# Patient Record
Sex: MALE | Race: WHITE | NOT HISPANIC OR LATINO | Employment: PART TIME | URBAN - METROPOLITAN AREA
[De-identification: names, ages, dates, MRNs, and addresses within clinical notes are randomized per-mention and may not be internally consistent; named-entity substitution may affect disease eponyms.]

---

## 2024-08-01 ENCOUNTER — OFFICE VISIT (OUTPATIENT)
Dept: BEHAVIORAL HEALTH | Facility: CLINIC | Age: 24
End: 2024-08-01
Payer: COMMERCIAL

## 2024-08-01 VITALS — DIASTOLIC BLOOD PRESSURE: 68 MMHG | SYSTOLIC BLOOD PRESSURE: 107 MMHG | HEART RATE: 74 BPM

## 2024-08-01 DIAGNOSIS — F17.200 TOBACCO USE DISORDER: ICD-10-CM

## 2024-08-01 DIAGNOSIS — F41.8 DEPRESSION WITH ANXIETY: ICD-10-CM

## 2024-08-01 DIAGNOSIS — F15.90 STIMULANT USE DISORDER: ICD-10-CM

## 2024-08-01 DIAGNOSIS — F11.20 OPIOID USE DISORDER, SEVERE, DEPENDENCE (H): Primary | ICD-10-CM

## 2024-08-01 PROCEDURE — 99204 OFFICE O/P NEW MOD 45 MIN: CPT | Performed by: NURSE PRACTITIONER

## 2024-08-01 RX ORDER — BUPRENORPHINE AND NALOXONE 4; 1 MG/1; MG/1
FILM, SOLUBLE BUCCAL; SUBLINGUAL
COMMUNITY
Start: 2024-07-24 | End: 2024-08-01 | Stop reason: DRUGHIGH

## 2024-08-01 RX ORDER — HYDROXYZINE HYDROCHLORIDE 25 MG/1
25 TABLET, FILM COATED ORAL 3 TIMES DAILY PRN
Qty: 45 TABLET | Refills: 0 | Status: SHIPPED | OUTPATIENT
Start: 2024-08-01

## 2024-08-01 RX ORDER — CLONIDINE HYDROCHLORIDE 0.1 MG/1
0.1 TABLET ORAL 3 TIMES DAILY PRN
Qty: 45 TABLET | Refills: 0 | Status: SHIPPED | OUTPATIENT
Start: 2024-08-01

## 2024-08-01 RX ORDER — BUPRENORPHINE AND NALOXONE 8; 2 MG/1; MG/1
FILM, SOLUBLE BUCCAL; SUBLINGUAL
Qty: 21 FILM | Refills: 0 | Status: SHIPPED | OUTPATIENT
Start: 2024-08-01 | End: 2024-08-29 | Stop reason: DRUGHIGH

## 2024-08-01 RX ORDER — CLONIDINE HYDROCHLORIDE 0.1 MG/1
TABLET ORAL
COMMUNITY
Start: 2024-07-24 | End: 2024-08-01

## 2024-08-01 RX ORDER — HYDROXYZINE HYDROCHLORIDE 25 MG/1
TABLET, FILM COATED ORAL
COMMUNITY
Start: 2024-07-24 | End: 2024-08-01

## 2024-08-01 ASSESSMENT — PATIENT HEALTH QUESTIONNAIRE - PHQ9: SUM OF ALL RESPONSES TO PHQ QUESTIONS 1-9: 7

## 2024-08-01 ASSESSMENT — COLUMBIA-SUICIDE SEVERITY RATING SCALE - C-SSRS
6. HAVE YOU EVER DONE ANYTHING, STARTED TO DO ANYTHING, OR PREPARED TO DO ANYTHING TO END YOUR LIFE?: NO
1. IN THE PAST MONTH, HAVE YOU WISHED YOU WERE DEAD OR WISHED YOU COULD GO TO SLEEP AND NOT WAKE UP?: NO
2. HAVE YOU ACTUALLY HAD ANY THOUGHTS OF KILLING YOURSELF?: NO

## 2024-08-01 NOTE — PROGRESS NOTES
[facility] Initial Visit    ASSESSMENT/PLAN                                                      1. Opioid use disorder, severe, dependence (H)  23 year ol male with 3 year h/o fentnayl/oxycodone use. Most recently using Oxycodone 200 mg daily, last use was 7/1/24. He was started on suboxone 12 mg/day while at [facility] and is reporting control of symptoms. He has plans to start Nuway tomorrow.   Continue effective dose of suboxone 12 mg/day.  Providing access to narcan  Proceed with plans to begin IOP at [facility] as scheduled.   Encouraged meeting attendance and avoiding people and places that trigger use.   - buprenorphine HCl-naloxone HCl (SUBOXONE) 8-2 MG per film; Place 0.5 Film under the tongue every morning AND 1 Film every evening.  Dispense: 21 Film; Refill: 0  - naloxone (NARCAN) 4 MG/0.1ML nasal spray; Spray 1 spray (4 mg) into one nostril alternating nostrils as needed for opioid reversal every 2-3 minutes until assistance arrives  Dispense: 0.2 mL; Refill: 11    2. Stimulant use disorder   5 month h/o of daily cocaine use last use was 7/1/2024. Denies cravings.   Proceed with plans to begin IOP at [facility].  Meetings encouraged.   Avoid bupropion. Seizure last week after trial of bupropion.     3. Tobacco use disorder  Is smoking and vaping, not interested in quitting.     4. Depression with anxiety  Was started on prozac last week, needs refill. Also taking clonidine and hydroxyzine.   Rx provided and referral placed for psychiatry per his request.   - Adult Mental Health  Referral; Future  - cloNIDine (CATAPRES) 0.1 MG tablet; Take 1 tablet (0.1 mg) by mouth 3 times daily as needed (anxiety)  Dispense: 45 tablet; Refill: 0  - FLUoxetine (PROZAC) 20 MG capsule; Take 1 capsule (20 mg) by mouth daily  Dispense: 30 capsule; Refill: 0  - hydrOXYzine HCl (ATARAX) 25 MG tablet; Take 1 tablet (25 mg) by mouth 3 times daily as needed for anxiety  Dispense: 45 tablet; Refill:  0         Return in about 2 weeks (around 8/15/2024) for Follow up, in person.      Patient counseling completed today:  Discussed mechanism of action, potential risks/benefits/side effects of medications and other recommendations above.    Discussed risk of precipitated withdrawal with initiation of buprenorphine in the presence of full opioid agonists.    Reviewed directions for initiation of buprenorphine to reduce risk of precipitated withdrawal and maximize efficacy.    Harm reduction counseling including never use alone, availability of naloxone, avoiding combination of opioids with benzodiazepines, alcohol, or other sedatives, safer administration.      Discussed importance of avoiding isolation, building a network of supportive relationships, avoiding people/places/things associated with past use to reduce risk of relapse; including motivational interviewing regarding psychosocial treatment for addiction.     SUBJECTIVE                                                    Rooming information:  Preferred name and pronouns: he/him   Reason for visit: to get suboxone, currently on suboxone  Last use of fentanyl or other full opioid agonist: 1month  Last dose of buprenorphine (if applicable:) 8/1/24  Withdrawal symptoms currently: none  Other substances taken in the last 2 weeks: none  LMP (if applicable:) NA  Food/housing/insurance assistance needed: none  3rd party involvement desired: None  Best phone number for patient: [phone]    Depression Response    Patient completed the PHQ-9 assessment for depression and scored >9? No  Question 9 on the PHQ-9 was positive for suicidality? No  Does patient have current mental health provider? No    Is this a virtual visit? No    I personally notified the following: NA        CC/HPI:  Rambo Scott is a 23 year old male with PMH depression  anxiety, seizure (with Bupropion), and opioid use disorder who presents to the Recovery Clinic for initial visit.      Brief  History:  Rambo Scott was first seen in Recovery Clinic on 24. They were referred by [facility].   Patient's reasons for seeking treatment include [facility]  Was started on subxoone while in [facility], is taking 12 mg/day.     Substance Use History :  Opioids:   Age at first use: [age] started using fentanyl. Was tired of being sick so he was began using Oxycodone 200 mg daily.   Current use: substance: Oxycodone; quantity 200 mg route: insufflation ; timing of last use: 2024;      IV drug use: No   History of overdose: No  Previous residential or outpatient treatments for addiction : Yes:  [facility]  Previous medication treatments for addiction: Yes: Suboxone  Longest period of sobriety: 31 days (current)  Medical complications related to substance use: Dennipatricia  Hepatitis C: Negative; Date of most recent testin weeks ago  HIV: Non-reactive; Date of most recent testin weeks ago    DSM-5 OUD criteria met:  Taken in larger amounts/greater time spent in behavior over longer period of time than intended,Yes:    Persistent desire or unsuccessful efforts to cut down or control use/behavior, Yes:    A great deal of time is spent in activities necessary to obtain the substance/participate in the behavior or recover from its effects, Yes:    Cravings, Yes:    Recurrent use/behavior resulting in failure to fulfill major role obligations at work, school, or home, Yes:    Continued use/behavior despite having persistent or recurrent social or interpersonal problems caused or exacerbated by effects of use/behavior, Yes:    Important social, occupational, or recreational activities are given up or reduced because of use/behavior, Yes:    Recurrent use/behavior in situations in which it is physically hazardous, Yes:    Continued use/behavior despite knowledge of having a persistent or recurrent physical or psychological problem that is likely to have been caused or exacerbated by  "use/behavior, Yes:    Tolerance, Yes:     Withdrawal, Yes:      Other Addiction History:  Stimulants (cocaine, methamphetamine, MDMA/ecstasy)   H/o daily cocaine use for 5 motnhs prior to treatment  Sedatives/hypnotics/anxiolytics: (benzodiazepines, GHB, Ambien, phenobarbital)  Occasional use  of Benzos, last use 2 motnhs  Alcohol:   Denies  Nicotine: (cigarettes, vaping, chew/snuff)  Vaping and smoking  Cannabis:   H/o of daily use, \"was a problem until going to treatment.\"  Hallucinogens/Dissociatives: (acid, mushrooms, ketamine)  Has tried a few times  Eating disorder:  Denies  Gambling:   Once        [place] Prescription Drug Monitoring Program Reviewed:  Yes; No prescription refills      PAST PSYCHIATRIC HISTORY:  Diagnoses- Depression and anxiety  Suicide Attempts: No   Hospitalizations: No         8/1/2024     3:00 PM   PHQ   PHQ-9 Total Score 7   Q9: Thoughts of better off dead/self-harm past 2 weeks Not at all         Social History  Housing status: with parents  Education: HS graduate  Employment status: Unemployed, not seeking work  Relationship status: Partnered  Children: no children  Legal: denies        Medications:  Current Outpatient Medications   Medication Sig Dispense Refill    buprenorphine HCl-naloxone HCl (SUBOXONE) 8-2 MG per film Place 0.5 Film under the tongue every morning AND 1 Film every evening. 21 Film 0    cloNIDine (CATAPRES) 0.1 MG tablet Take 1 tablet (0.1 mg) by mouth 3 times daily as needed (anxiety) 45 tablet 0    FLUoxetine (PROZAC) 20 MG capsule Take 1 capsule (20 mg) by mouth daily 30 capsule 0    hydrOXYzine HCl (ATARAX) 25 MG tablet Take 1 tablet (25 mg) by mouth 3 times daily as needed for anxiety 45 tablet 0    naloxone (NARCAN) 4 MG/0.1ML nasal spray Spray 1 spray (4 mg) into one nostril alternating nostrils as needed for opioid reversal every 2-3 minutes until assistance arrives 0.2 mL 11     No current facility-administered medications for this visit.       No Known " "Allergies    No past medical history on file.    No past surgical history on file.    No family history on file.      REVIEW OF SYSTEMS:  General: Withdrawal symptoms as described below.  No recent fevers.   Eyes:  No vision concerns.  No jaundice.    Resp: No coughing, wheezing or shortness of breath  CV: No chest pains or palpitations  GI: No complaints other than as above  : No urinary frequency or dysuria, no discharge  Musculoskeletal: No significant muscle or joint pains other than as above.  No edema  Neurologic: No numbness, tingling, weakness, problems with balance or coordination  Psychiatric: No acute concerns other than as above.   Skin: No rashes or areas of acute infection    OBJECTIVE                                                              /68   Pulse 74     Physical Exam  Cardiovascular:      Rate and Rhythm: Normal rate.   Pulmonary:      Effort: Pulmonary effort is normal. No respiratory distress.   Neurological:      General: No focal deficit present.      Mental Status: He is alert and oriented to person, place, and time.   Psychiatric:         Attention and Perception: Attention normal.         Mood and Affect: Mood normal.         Speech: Speech normal.         Behavior: Behavior is cooperative.         Thought Content: Thought content normal. Thought content does not include suicidal ideation.         Judgment: Judgment normal.         Labs:    UDS:   No results found for: \"BUP\", \"BZO\", \"BAR\", \"DENNIS\", \"MAMP\", \"AMP\", \"MDMA\", \"MTD\", \"LRB220\", \"OXY\", \"PCP\", \"THC\", \"TEMP\", \"SGPOCT\"    *POC urine drug screen does not screen for Fentanyl    No results found for this or any previous visit (from the past 720 hour(s)).                  [facility]  [address], [address]  [place] [address]  [phone]      "

## 2024-08-16 ENCOUNTER — OFFICE VISIT (OUTPATIENT)
Dept: BEHAVIORAL HEALTH | Facility: CLINIC | Age: 24
End: 2024-08-16
Payer: COMMERCIAL

## 2024-08-16 VITALS — SYSTOLIC BLOOD PRESSURE: 119 MMHG | DIASTOLIC BLOOD PRESSURE: 70 MMHG | HEART RATE: 87 BPM

## 2024-08-16 DIAGNOSIS — F11.20 OPIOID USE DISORDER, SEVERE, DEPENDENCE (H): Primary | ICD-10-CM

## 2024-08-16 DIAGNOSIS — F43.21 GRIEF: ICD-10-CM

## 2024-08-16 DIAGNOSIS — F11.20 OPIOID USE DISORDER, SEVERE, DEPENDENCE (H): ICD-10-CM

## 2024-08-16 LAB
AMPHETAMINE QUAL URINE POCT: NEGATIVE
BARBITURATE QUAL URINE POCT: NEGATIVE
BENZODIAZEPINE QUAL URINE POCT: NEGATIVE
BUPRENORPHINE QUAL URINE POCT: ABNORMAL
COCAINE QUAL URINE POCT: NEGATIVE
CREATININE QUAL URINE POCT: ABNORMAL
INTERNAL QC QUAL URINE POCT: ABNORMAL
MDMA QUAL URINE POCT: NEGATIVE
METHADONE QUAL URINE POCT: NEGATIVE
METHAMPHETAMINE QUAL URINE POCT: NEGATIVE
OPIATE QUAL URINE POCT: NEGATIVE
OXYCODONE QUAL URINE POCT: ABNORMAL
PH QUAL URINE POCT: ABNORMAL
PHENCYCLIDINE QUAL URINE POCT: NEGATIVE
POCT KIT EXPIRATION DATE: ABNORMAL
POCT KIT LOT NUMBER: ABNORMAL
SPECIFIC GRAVITY POCT: 1.02
TEMPERATURE URINE POCT: ABNORMAL
THC QUAL URINE POCT: NEGATIVE

## 2024-08-16 PROCEDURE — 99214 OFFICE O/P EST MOD 30 MIN: CPT | Performed by: FAMILY MEDICINE

## 2024-08-16 PROCEDURE — G0480 DRUG TEST DEF 1-7 CLASSES: HCPCS | Mod: 90

## 2024-08-16 PROCEDURE — G2211 COMPLEX E/M VISIT ADD ON: HCPCS | Performed by: FAMILY MEDICINE

## 2024-08-16 PROCEDURE — 80305 DRUG TEST PRSMV DIR OPT OBS: CPT | Performed by: FAMILY MEDICINE

## 2024-08-16 RX ORDER — BUPRENORPHINE AND NALOXONE 4; 1 MG/1; MG/1
1 FILM, SOLUBLE BUCCAL; SUBLINGUAL 3 TIMES DAILY
Qty: 42 FILM | Refills: 0 | Status: SHIPPED | OUTPATIENT
Start: 2024-08-16 | End: 2024-08-29 | Stop reason: DRUGHIGH

## 2024-08-16 RX ORDER — BUPRENORPHINE AND NALOXONE 4; 1 MG/1; MG/1
1 FILM, SOLUBLE BUCCAL; SUBLINGUAL 3 TIMES DAILY
Qty: 42 FILM | Refills: 0 | Status: SHIPPED | OUTPATIENT
Start: 2024-08-16 | End: 2024-08-16

## 2024-08-16 ASSESSMENT — PATIENT HEALTH QUESTIONNAIRE - PHQ9: SUM OF ALL RESPONSES TO PHQ QUESTIONS 1-9: 9

## 2024-08-16 NOTE — PROGRESS NOTES
[facility] Follow Up    ASSESSMENT/PLAN                                                      1. Opioid use disorder, severe, dependence (H)  Brief return to use.  He reflects on this and has discussed with his counselor at [facility].  Continue Suboxone, would like to change dosing to 4-1mg TID instead of using 8-2mg films - confirmed with pharmacy that 4-1mg films in stock and script will be covered by insurance.  Encouraged continued programming at [facility].    - Drugs of Abuse Screen Urine (POC CUPS) POCT; Standing  - Buprenorphine & Metabolite Screen; Future  - Drugs of Abuse Screen Urine (POC CUPS) POCT  - Comprehensive metabolic panel (BMP + Alb, Alk Phos, ALT, AST, Total. Bili, TP); Future  - buprenorphine HCl-naloxone HCl (SUBOXONE) 4-1 MG per film; Place 1 Film under the tongue 3 times daily  Dispense: 42 Film; Refill: 0    2. Grief  Encouraged him to reach out to clinic to schedule with Don if needed.           Return in about 2 weeks (around 8/30/2024) for Follow up, in person.      Patient counseling completed today:  Discussed mechanism of action, potential risks/benefits/side effects of medications and other recommendations above.     Discussed risk of precipitated withdrawal with initiation of buprenorphine in the presence of full opioid agonists.    Reviewed directions for initiation of buprenorphine to reduce risk of precipitated withdrawal and maximize efficacy.    Harm reduction counseling including never use alone, availability of naloxone, risks associated with concurrent use of opioids and benzodiazepines, alcohol, or other sedatives, safer administration as applicable.  Discussed importance of avoiding isolation, building a network of supportive relationships, avoiding people/places/things associated with past use to reduce risk of relapse; including motivational interviewing regarding psychosocial interventions.   SUBJECTIVE                                                           CC/HPI:  Rambo Scott is a 23 year old male with PMH depression  anxiety, seizure (with Bupropion), and opioid use disorder who presents to the Recovery Clinic for return visit.       Brief History:  Rambo Scott was first seen in Recovery Clinic on 08/01/24. They were referred by [facility].   Patient's reasons for seeking treatment include to establish with Suboxone prescriber.  Was started on Subxone 12-3mg while in [facility].    Recommendations last visit: 8/1/24  1. Opioid use disorder, severe, dependence (H)  23 year ol male with 3 year h/o fentnayl/oxycodone use. Most recently using Oxycodone 200 mg daily, last use was 7/1/24. He was started on suboxone 12 mg/day while at [facility] and is reporting control of symptoms. He has plans to start Nuway tomorrow.   Continue effective dose of suboxone 12 mg/day.  Providing access to narcan  Proceed with plans to begin IOP at [facility] as scheduled.   Encouraged meeting attendance and avoiding people and places that trigger use.   - buprenorphine HCl-naloxone HCl (SUBOXONE) 8-2 MG per film; Place 0.5 Film under the tongue every morning AND 1 Film every evening.  Dispense: 21 Film; Refill: 0  - naloxone (NARCAN) 4 MG/0.1ML nasal spray; Spray 1 spray (4 mg) into one nostril alternating nostrils as needed for opioid reversal every 2-3 minutes until assistance arrives  Dispense: 0.2 mL; Refill: 11     2. Stimulant use disorder   5 month h/o of daily cocaine use last use was 7/1/2024. Denies cravings.   Proceed with plans to begin IOP at [facility].  Meetings encouraged.   Avoid bupropion. Seizure last week after trial of bupropion.      3. Tobacco use disorder  Is smoking and vaping, not interested in quitting.      4. Depression with anxiety  Was started on prozac last week, needs refill. Also taking clonidine and hydroxyzine.   Rx provided and referral placed for psychiatry per his request.   - Adult Mental Health  Referral; Future  - cloNIDine  "(CATAPRES) 0.1 MG tablet; Take 1 tablet (0.1 mg) by mouth 3 times daily as needed (anxiety)  Dispense: 45 tablet; Refill: 0  - FLUoxetine (PROZAC) 20 MG capsule; Take 1 capsule (20 mg) by mouth daily  Dispense: 30 capsule; Refill: 0  - hydrOXYzine HCl (ATARAX) 25 MG tablet; Take 1 tablet (25 mg) by mouth 3 times daily as needed for anxiety  Dispense: 45 tablet; Refill: 0    24 HPI:  Shared recent return to use - left outpatient at noon and a friend called him crying and told him that his 18 year old sister  (overdose), they are all close friends, Thor saw her 3 days ago  Friend picked him up and they spent day with family  Took a shot of alcohol with her mom, smoked weed with cannabis and felt really awful being high, took a Perc 30 - was planning to use more but he stopped - \"it wasn't giving him what he needed anymore\"  Left and went to a meeting  Talked to his counselor today, felt supported but also increased his shame/guilt/self-disgust  Took Suboxone today, feels current dose is effective    Substance Use History :  Opioids:   Age at first use: [age] started using fentanyl. Was tired of being sick so he was began using Oxycodone 200 mg daily.   Current use: substance: Oxycodone; quantity 200 mg route: insufflation ; timing of last use: 2024;                 IV drug use: No   History of overdose: No  Previous residential or outpatient treatments for addiction : Yes:  [facility]  Previous medication treatments for addiction: Yes: Suboxone  Longest period of sobriety: 31 days (current)  Medical complications related to substance use: Dennipatricia  Hepatitis C: Negative; Date of most recent testin weeks ago  HIV: Non-reactive; Date of most recent testin weeks ago     Other Addiction History:  Stimulants (cocaine, methamphetamine, MDMA/ecstasy)   H/o daily cocaine use for 5 motnhs prior to treatment  Sedatives/hypnotics/anxiolytics: (benzodiazepines, GHB, Ambien, phenobarbital)  Occasional use  of " "Benzos, last use 2 motnhs  Alcohol:   Denies  Nicotine: (cigarettes, vaping, chew/snuff)  Vaping and smoking  Cannabis:   H/o of daily use, \"was a problem until going to treatment.\"  Hallucinogens/Dissociatives: (acid, mushrooms, ketamine)  Has tried a few times  Eating disorder:  Denies  Gambling:              Once     PAST PSYCHIATRIC HISTORY:  Diagnoses- Depression and anxiety  Suicide Attempts: No   Hospitalizations: No          8/1/2024     3:00 PM 8/16/2024     3:00 PM   PHQ   PHQ-9 Total Score 7 9   Q9: Thoughts of better off dead/self-harm past 2 weeks Not at all Not at all       Social History  Housing status: with parents  Education: HS graduate  Employment status: Unemployed, not seeking work  Relationship status: Partnered  Children: no children  Legal: denies      Labs discussed with patient?  Yes      [place] Prescription Drug Monitoring Program Reviewed:  Yes        Medications:  Current Outpatient Medications   Medication Sig Dispense Refill    buprenorphine HCl-naloxone HCl (SUBOXONE) 4-1 MG per film Place 1 Film under the tongue 3 times daily 42 Film 0    buprenorphine HCl-naloxone HCl (SUBOXONE) 8-2 MG per film Place 0.5 Film under the tongue every morning AND 1 Film every evening. 21 Film 0    cloNIDine (CATAPRES) 0.1 MG tablet Take 1 tablet (0.1 mg) by mouth 3 times daily as needed (anxiety) 45 tablet 0    FLUoxetine (PROZAC) 20 MG capsule Take 1 capsule (20 mg) by mouth daily 30 capsule 0    hydrOXYzine HCl (ATARAX) 25 MG tablet Take 1 tablet (25 mg) by mouth 3 times daily as needed for anxiety 45 tablet 0    naloxone (NARCAN) 4 MG/0.1ML nasal spray Spray 1 spray (4 mg) into one nostril alternating nostrils as needed for opioid reversal every 2-3 minutes until assistance arrives 0.2 mL 11     No current facility-administered medications for this visit.       No Known Allergies    PMH, PSH, FamHx reviewed      OBJECTIVE                                                      /70   Pulse 87 "     Physical Exam  Vitals and nursing note reviewed.   Constitutional:       General: He is not in acute distress.     Appearance: Normal appearance. He is not ill-appearing or diaphoretic.   Cardiovascular:      Rate and Rhythm: Normal rate.   Pulmonary:      Effort: Pulmonary effort is normal. No respiratory distress.   Neurological:      General: No focal deficit present.      Mental Status: He is alert and oriented to person, place, and time.   Psychiatric:         Mood and Affect: Mood normal. Affect is tearful.         Behavior: Behavior normal.         Thought Content: Thought content normal.         Judgment: Judgment normal.         Labs:    UDS:    Lab Results   Component Value Date    BUP Screen Positive (A) 08/16/2024    BZO Negative 08/16/2024    BAR Negative 08/16/2024    DENNIS Negative 08/16/2024    MAMP Negative 08/16/2024    AMP Negative 08/16/2024    MDMA Negative 08/16/2024    MTD Negative 08/16/2024    [id number] Negative 08/16/2024    OXY Screen Positive (A) 08/16/2024    PCP Negative 08/16/2024    THC Negative 08/16/2024    TEMP 90 F 08/16/2024    SGPOCT 1.025 08/16/2024     *POC urine drug screen does not screen for Fentanyl      No results found for this or any previous visit (from the past 240 hour(s)).      Simin Anaya, [place] [id number] [place] [id number]  [phone]

## 2024-08-16 NOTE — NURSING NOTE
[facility]      Rooming information:  RELAPSED YESTERDAY DUE TO FRIEND PASSING  Approximate last use of full opioid agonist: 8/15/24 oxy 30  Taking buprenorphine? Yes:   How much per day? 12mg  Number of buprenorphine films/tablets remaining currently: 0  Side effects related to buprenorphine (constipation, dry mouth, sedation?) No   Narcan currently available: Yes  Other recent substance use:    Alcohol  and Cannabis   NICOTINE-Yes:   If using nicotine, ready to quit? No    Point of care urine drug screen positive for:  Lab Results   Component Value Date    BUP Screen Positive (A) 08/16/2024    BZO Negative 08/16/2024    BAR Negative 08/16/2024    DENNIS Negative 08/16/2024    MAMP Negative 08/16/2024    AMP Negative 08/16/2024    MDMA Negative 08/16/2024    MTD Negative 08/16/2024    [id number] Negative 08/16/2024    OXY Screen Positive (A) 08/16/2024    PCP Negative 08/16/2024    THC Negative 08/16/2024    TEMP 90 F 08/16/2024    SGPOCT 1.025 08/16/2024       *POC urine drug screen does not screen for Fentanyl        Depression Response    Patient completed the PHQ-9 assessment for depression and scored >9? Yes  Question 9 on the PHQ-9 was positive for suicidality? No  Does patient have current mental health provider? No  C-SSRS screener risk level.       Is this a virtual visit? No    I personally notified the following: [place]          8/16/2024     3:00 PM   PHQ Assesment Total Score(s)   PHQ-9 Score 9         Housing needs: stable    Insurance: active    Current legal issues: none    Contact information up to date? yes    3rd Party Involvement not today (please obtain ELI if pt would like to include)    Brianna Helm MA  August 16, 2024  3:25 PM

## 2024-08-16 NOTE — PATIENT INSTRUCTIONS
[place] [id number], [address]   [place], [address]  Phone: [phone]  Fax: [phone]    Open Monday-Friday  Closed over lunch hour  Walk in hours: 9am-11:30am and 12:30-3pm

## 2024-08-19 LAB
BUPRENORPHINE UR-MCNC: 80 NG/ML
BUPRENORPHINE UR-MCNC: <2 NG/ML
NALOXONE UR CFM-MCNC: <100 NG/ML
NORBUPRENORPHINE UR CFM-MCNC: 153 NG/ML
NORBUPRENORPHINE UR-MCNC: 34 NG/ML

## 2024-08-29 ENCOUNTER — OFFICE VISIT (OUTPATIENT)
Dept: BEHAVIORAL HEALTH | Facility: CLINIC | Age: 24
End: 2024-08-29
Payer: COMMERCIAL

## 2024-08-29 VITALS — DIASTOLIC BLOOD PRESSURE: 61 MMHG | HEART RATE: 77 BPM | SYSTOLIC BLOOD PRESSURE: 105 MMHG

## 2024-08-29 DIAGNOSIS — T40.2X5A CONSTIPATION DUE TO OPIOID THERAPY: ICD-10-CM

## 2024-08-29 DIAGNOSIS — F15.90 STIMULANT USE DISORDER: ICD-10-CM

## 2024-08-29 DIAGNOSIS — K59.03 CONSTIPATION DUE TO OPIOID THERAPY: ICD-10-CM

## 2024-08-29 DIAGNOSIS — F11.20 OPIOID USE DISORDER, SEVERE, DEPENDENCE (H): Primary | ICD-10-CM

## 2024-08-29 LAB
AMPHETAMINE QUAL URINE POCT: NEGATIVE
BARBITURATE QUAL URINE POCT: NEGATIVE
BENZODIAZEPINE QUAL URINE POCT: NEGATIVE
BUPRENORPHINE QUAL URINE POCT: ABNORMAL
COCAINE QUAL URINE POCT: NEGATIVE
CREATININE QUAL URINE POCT: ABNORMAL
INTERNAL QC QUAL URINE POCT: ABNORMAL
MDMA QUAL URINE POCT: NEGATIVE
METHADONE QUAL URINE POCT: NEGATIVE
METHAMPHETAMINE QUAL URINE POCT: NEGATIVE
OPIATE QUAL URINE POCT: NEGATIVE
OXYCODONE QUAL URINE POCT: NEGATIVE
PH QUAL URINE POCT: ABNORMAL
PHENCYCLIDINE QUAL URINE POCT: NEGATIVE
POCT KIT EXPIRATION DATE: ABNORMAL
POCT KIT LOT NUMBER: ABNORMAL
SPECIFIC GRAVITY POCT: 1
TEMPERATURE URINE POCT: ABNORMAL
THC QUAL URINE POCT: NEGATIVE

## 2024-08-29 PROCEDURE — 80305 DRUG TEST PRSMV DIR OPT OBS: CPT | Performed by: NURSE PRACTITIONER

## 2024-08-29 PROCEDURE — 99214 OFFICE O/P EST MOD 30 MIN: CPT | Performed by: NURSE PRACTITIONER

## 2024-08-29 RX ORDER — DOCUSATE SODIUM 100 MG/1
100 CAPSULE, LIQUID FILLED ORAL 2 TIMES DAILY
Qty: 60 CAPSULE | Refills: 1 | Status: SHIPPED | OUTPATIENT
Start: 2024-08-29

## 2024-08-29 RX ORDER — FLUOXETINE 10 MG/1
30 TABLET, FILM COATED ORAL DAILY
COMMUNITY
End: 2024-09-26

## 2024-08-29 RX ORDER — BUPRENORPHINE AND NALOXONE 4; 1 MG/1; MG/1
2 FILM, SOLUBLE BUCCAL; SUBLINGUAL 2 TIMES DAILY
Qty: 56 FILM | Refills: 0 | Status: SHIPPED | OUTPATIENT
Start: 2024-08-29 | End: 2024-09-13

## 2024-08-29 RX ORDER — POLYETHYLENE GLYCOL 3350 17 G/17G
1 POWDER, FOR SOLUTION ORAL DAILY
Qty: 578 G | Refills: 0 | Status: SHIPPED | OUTPATIENT
Start: 2024-08-29

## 2024-08-29 ASSESSMENT — PATIENT HEALTH QUESTIONNAIRE - PHQ9: SUM OF ALL RESPONSES TO PHQ QUESTIONS 1-9: 6

## 2024-08-29 NOTE — NURSING NOTE
[facility]      Rooming information:    Discuss increasing suboxone dose; having some cravings     Approximate last use of full opioid agonist: 8/15/24  Taking buprenorphine? Yes:   How much per day? 12mg   Number of buprenorphine films/tablets remaining currently: 0  Side effects related to buprenorphine (constipation, dry mouth, sedation?) No   Narcan currently available: Yes  Other recent substance use:    Denies  NICOTINE-Yes:   If using nicotine, ready to quit? No    Point of care urine drug screen positive for:  Lab Results   Component Value Date    BUP Screen Positive (A) 08/29/2024    BZO Negative 08/29/2024    BAR Negative 08/29/2024    DENNIS Negative 08/29/2024    MAMP Negative 08/29/2024    AMP Negative 08/29/2024    MDMA Negative 08/29/2024    MTD Negative 08/29/2024    [id number] Negative 08/29/2024    OXY Negative 08/29/2024    PCP Negative 08/29/2024    THC Negative 08/29/2024    TEMP 92 F 08/29/2024    SGPOCT 1.005 08/29/2024       *POC urine drug screen does not screen for Fentanyl      Depression Response    Patient completed the PHQ-9 assessment for depression and scored >9? No  Question 9 on the PHQ-9 was positive for suicidality? No  Does patient have current mental health provider? No  C-SSRS screener risk level.       Is this a virtual visit? No    I personally notified the following: [place]           8/29/2024     3:00 PM   PHQ Assesment Total Score(s)   PHQ-9 Score 6         Housing needs: stable     Insurance: active     Current legal issues: none     Contact information up to date? Yes     3rd Party Involvement  (please obtain ELI if pt would like to include)    Glenn River MA  August 29, 2024  3:33 PM

## 2024-08-29 NOTE — PROGRESS NOTES
[facility] Follow Up    ASSESSMENT/PLAN                                                      1. Opioid use disorder, severe, dependence (H)  - No opioid use since last visit, persistent cravings, increased dose to 16 mg buprenorphine/day. Pt prefers 4 mg films due to taste.   - POC UDS completed today as expected   - continue programming at [facility] beds at the [facility]   - confirms access to narcan   - Drugs of Abuse Screen Urine (POC CUPS) POCT  - buprenorphine HCl-naloxone HCl (SUBOXONE) 4-1 MG per film; Place 2 Film under the tongue 2 times daily for 14 days.  Dispense: 56 Film; Refill: 0    2. Stimulant use disorder  - no recnet use or cravings.   - monitor   - continue programming at [facility] beds at the [facility]     3. Constipation due to opioid therapy  - Start miralax and colace. After BM become more regular and soft change miralax to prn use and continue colace.   - increase dietary fiber and daily water intake   - OTC hemorrhoid  preparations if needed   - if no improvement will start Movantik or Amitiza   - docusate sodium (COLACE) 100 MG capsule; Take 1 capsule (100 mg) by mouth 2 times daily.  Dispense: 60 capsule; Refill: 1  - polyethylene glycol (MIRALAX) 17 GM/Dose powder; Take 17 g (1 Capful) by mouth daily.  Dispense: 578 g; Refill: 0         Return in about 2 weeks (around 9/12/2024) for Follow up, with any available provider, in person.      Patient counseling completed today:  Discussed mechanism of action, potential risks/benefits/side effects of medications and other recommendations above.    Harm reduction counseling including never use alone, availability of naloxone, risks associated with concurrent use of opioids and benzodiazepines, alcohol, or other sedatives, safer administration as applicable.  Discussed importance of avoiding isolation, building a network of supportive relationships, avoiding people/places/things associated with past use to reduce risk of relapse; including motivational interviewing  regarding psychosocial interventions.     A/P from most recent visit:   1. Opioid use disorder, severe, dependence (H)  Brief return to use.  He reflects on this and has discussed with his counselor at [facility].  Continue Suboxone, would like to change dosing to 4-1mg TID instead of using 8-2mg films - confirmed with pharmacy that 4-1mg films in stock and script will be covered by insurance.  Encouraged continued programming at [facility].    - Drugs of Abuse Screen Urine (POC CUPS) POCT; Standing  - Buprenorphine & Metabolite Screen; Future  - Drugs of Abuse Screen Urine (POC CUPS) POCT  - Comprehensive metabolic panel (BMP + Alb, Alk Phos, ALT, AST, Total. Bili, TP); Future  - buprenorphine HCl-naloxone HCl (SUBOXONE) 4-1 MG per film; Place 1 Film under the tongue 3 times daily  Dispense: 42 Film; Refill: 0     2. Grief  Encouraged him to reach out to clinic to schedule with Don if needed.        SUBJECTIVE                                                        CC/HPI:  Rambo Scott is a 23 year old male with PMH depression  anxiety, seizure (with Bupropion), and opioid use disorder who presents to the [facility] Clinic for return visit.        08/29/24 HPI:  - has been taking 4 mg in AM and 8 mg in PM, wondering if does can be increased for cravings. Cravings have been daily.   - taking Miralax prn for constipation  - last opioid use was 8/15/24   - did schedule PCP appointment  - has had elevated LFT's in the past,  and AST was 102. Previously 274 and 105   - grew up in [place], he lives with his mother. His mother is a nursing professor   - he would like to move back to [place] one day      Brief History:  Rambo Scott was first seen in [facility] on 08/01/24. They were referred by [facility].   Patient's reasons for seeking treatment include to establish with Suboxone prescriber.  Was started on Subxone 12-3mg while in [facility] [facility].     Substance Use History :  Opioids:   Age at first use: [age]  "started using fentanyl. Was tired of being sick so he was began using Oxycodone 200 mg daily.   Current use: substance: Oxycodone; quantity 200 mg route: insufflation ; timing of last use: 8/216/24                IV drug use: No   History of overdose: No  Previous residential or outpatient treatments for addiction : Yes:  [facility]  Previous medication treatments for addiction: Yes: Suboxone  Longest period of sobriety: 31 days (current)  Medical complications related to substance use: Dennies  Hepatitis C: Negative on 7/17/23, reports more recent screening in July 2024   HIV: Non-reactive on 7/17/23, reports more recent screening in July 2024      Other Addiction History:  Stimulants (cocaine, methamphetamine, MDMA/ecstasy)   H/o daily cocaine use for 5 motnhs prior to treatment  Sedatives/hypnotics/anxiolytics: (benzodiazepines, GHB, Ambien, phenobarbital)  Occasional use  of Benzos, last use 2 motnhs  Alcohol:   Denies  Nicotine: (cigarettes, vaping, chew/snuff)  Vaping and smoking  Cannabis:   H/o of daily use, \"was a problem until going to treatment.\"  Hallucinogens/Dissociatives: (acid, mushrooms, ketamine)  Has tried a few times  Eating disorder:  Denies  Gambling:              Once     PAST PSYCHIATRIC HISTORY:  Diagnoses- Depression and anxiety  Suicide Attempts: No   Hospitalizations: No           8/1/2024     3:00 PM 8/16/2024     3:00 PM 8/29/2024     3:00 PM   PHQ   PHQ-9 Total Score 7 9 6   Q9: Thoughts of better off dead/self-harm past 2 weeks Not at all Not at all Not at all       Social History  Housing status: with parents  Education: HS graduate  Employment status: Unemployed, not seeking work  Relationship status: Partnered  Children: no children  Legal: denies         Labs discussed with patient?  Yes      [place] Prescription Drug Monitoring Program Reviewed:  Yes- consistent with patient reports     Medications:  Current Outpatient Medications   Medication Sig Dispense Refill    " buprenorphine HCl-naloxone HCl (SUBOXONE) 4-1 MG per film Place 2 Film under the tongue 2 times daily for 14 days. 56 Film 0    docusate sodium (COLACE) 100 MG capsule Take 1 capsule (100 mg) by mouth 2 times daily. 60 capsule 1    FLUoxetine (PROZAC) 10 MG tablet Take 30 mg by mouth daily.      polyethylene glycol (MIRALAX) 17 GM/Dose powder Take 17 g (1 Capful) by mouth daily. 578 g 0    cloNIDine (CATAPRES) 0.1 MG tablet Take 1 tablet (0.1 mg) by mouth 3 times daily as needed (anxiety) 45 tablet 0    hydrOXYzine HCl (ATARAX) 25 MG tablet Take 1 tablet (25 mg) by mouth 3 times daily as needed for anxiety 45 tablet 0    naloxone (NARCAN) 4 MG/0.1ML nasal spray Spray 1 spray (4 mg) into one nostril alternating nostrils as needed for opioid reversal every 2-3 minutes until assistance arrives 0.2 mL 11     No current facility-administered medications for this visit.       No Known Allergies    PMH, PSH, FamHx reviewed      OBJECTIVE                                                      /61   Pulse 77     Physical Exam  Constitutional:       General: He is not in acute distress.     Appearance: Normal appearance.   Eyes:      Extraocular Movements: Extraocular movements intact.   Cardiovascular:      Rate and Rhythm: Normal rate.   Pulmonary:      Effort: Pulmonary effort is normal.   Neurological:      Mental Status: He is alert and oriented to person, place, and time.   Psychiatric:         Attention and Perception: Attention and perception normal.         Mood and Affect: Mood and affect normal.         Speech: Speech normal.         Behavior: Behavior is cooperative.         Thought Content: Thought content normal.         Cognition and Memory: Memory normal.         Judgment: Judgment normal.         Labs:    UDS:    Lab Results   Component Value Date    BUP Screen Positive (A) 08/29/2024    BZO Negative 08/29/2024    BAR Negative 08/29/2024    DENNIS Negative 08/29/2024    MAMP Negative 08/29/2024    AMP  Negative 08/29/2024    MDMA Negative 08/29/2024    MTD Negative 08/29/2024    [id number] Negative 08/29/2024    OXY Negative 08/29/2024    PCP Negative 08/29/2024    THC Negative 08/29/2024    TEMP 92 F 08/29/2024    SGPOCT 1.005 08/29/2024     *POC urine drug screen does not screen for Fentanyl      Recent Results (from the past 240 hour(s))   Drugs of Abuse Screen Urine (POC CUPS) POCT    Collection Time: 08/29/24  3:37 PM   Result Value Ref Range    POCT Kit Lot Number [id number]     POCT Kit Expiration Date 4/8/26     Temperature Urine POCT 92 F 90 F, 92 F, 94 F, 96 F, 98 F, 100 F    Specific [place] POCT 1.005 1.005, 1.015, 1.025    pH Qual Urine POCT 9 pH 4 pH, 5 pH, 7 pH, 9 pH    Creatinine Qual Urine POCT 100 mg/dL 20 mg/dL, 50 mg/dL, 100 mg/dL, 200 mg/dL    Internal QC Qual Urine POCT Valid Valid    Amphetamine Qual Urine POCT Negative Negative    Barbiturate Qual Urine POCT Negative Negative    Buprenorphine Qual Urine POCT Screen Positive (A) Negative    Benzodiazepine Qual Urine POCT Negative Negative    Cocaine Qual Urine POCT Negative Negative    Methamphetamine Qual Urine POCT Negative Negative    MDMA Qual Urine POCT Negative Negative    Methadone Qual Urine POCT Negative Negative    Opiate Qual Urine POCT Negative Negative    Oxycodone Qual Urine POCT Negative Negative    Phencyclidine Qual Urine POCT Negative Negative    THC Qual Urine POCT Negative Negative         OBINNA Browne [place] [id number] [address] [address] [phone]

## 2024-09-13 ENCOUNTER — OFFICE VISIT (OUTPATIENT)
Dept: BEHAVIORAL HEALTH | Facility: CLINIC | Age: 24
End: 2024-09-13
Payer: COMMERCIAL

## 2024-09-13 VITALS — DIASTOLIC BLOOD PRESSURE: 62 MMHG | SYSTOLIC BLOOD PRESSURE: 99 MMHG | OXYGEN SATURATION: 97 % | HEART RATE: 109 BPM

## 2024-09-13 DIAGNOSIS — Z79.891 ENCOUNTER FOR MONITORING OPIOID MAINTENANCE THERAPY: ICD-10-CM

## 2024-09-13 DIAGNOSIS — F17.200 TOBACCO USE DISORDER: ICD-10-CM

## 2024-09-13 DIAGNOSIS — F15.90 STIMULANT USE DISORDER: ICD-10-CM

## 2024-09-13 DIAGNOSIS — Z51.81 ENCOUNTER FOR MONITORING OPIOID MAINTENANCE THERAPY: ICD-10-CM

## 2024-09-13 DIAGNOSIS — F11.20 OPIOID USE DISORDER, SEVERE, DEPENDENCE (H): Primary | ICD-10-CM

## 2024-09-13 DIAGNOSIS — F41.8 DEPRESSION WITH ANXIETY: ICD-10-CM

## 2024-09-13 LAB
AMPHETAMINE QUAL URINE POCT: NEGATIVE
BARBITURATE QUAL URINE POCT: NEGATIVE
BENZODIAZEPINE QUAL URINE POCT: NEGATIVE
BUPRENORPHINE QUAL URINE POCT: ABNORMAL
COCAINE QUAL URINE POCT: NEGATIVE
CREATININE QUAL URINE POCT: ABNORMAL
INTERNAL QC QUAL URINE POCT: ABNORMAL
MDMA QUAL URINE POCT: NEGATIVE
METHADONE QUAL URINE POCT: NEGATIVE
METHAMPHETAMINE QUAL URINE POCT: NEGATIVE
OPIATE QUAL URINE POCT: NEGATIVE
OXYCODONE QUAL URINE POCT: NEGATIVE
PH QUAL URINE POCT: ABNORMAL
PHENCYCLIDINE QUAL URINE POCT: NEGATIVE
POCT KIT EXPIRATION DATE: ABNORMAL
POCT KIT LOT NUMBER: ABNORMAL
SPECIFIC GRAVITY POCT: 1.01
TEMPERATURE URINE POCT: ABNORMAL
THC QUAL URINE POCT: NEGATIVE

## 2024-09-13 PROCEDURE — 80305 DRUG TEST PRSMV DIR OPT OBS: CPT | Performed by: FAMILY MEDICINE

## 2024-09-13 PROCEDURE — 99215 OFFICE O/P EST HI 40 MIN: CPT | Performed by: FAMILY MEDICINE

## 2024-09-13 RX ORDER — BUPRENORPHINE AND NALOXONE 4; 1 MG/1; MG/1
2 FILM, SOLUBLE BUCCAL; SUBLINGUAL 2 TIMES DAILY
Qty: 60 FILM | Refills: 0 | Status: SHIPPED | OUTPATIENT
Start: 2024-09-13 | End: 2024-09-25

## 2024-09-13 ASSESSMENT — PATIENT HEALTH QUESTIONNAIRE - PHQ9: SUM OF ALL RESPONSES TO PHQ QUESTIONS 1-9: 10

## 2024-09-13 NOTE — PROGRESS NOTES
[facility] Follow Up    ASSESSMENT/PLAN                                                    1. Opioid use disorder, severe, dependence (H)  Pt reports last use of full opioid agonist remains 24.  Endorses cravings in setting of death of friend and her recent .  Is not interested in increasing TDD of buprenorphine.    Reports occasionally swallowing Suboxone film.    Recommended consolidating doses to help reduce risk of taking subtherapeutic dose.    Pt firmly wants to continue 4mg films due to intolerance of taste of higher strength films.   Reviewed importance of sublingual administration.   Pt would benefit from XR buprenorphine.  He is interested in transfer to [facility] in the future, not now.    Continue programming at [facility]  Naloxone prescribed  Continue colace, Miralax prn OIC  - Drugs of Abuse Screen Urine (POC CUPS) POCT  - Drug Confirmation Panel Urine with Creat - lab collect; Future  - naloxone (NARCAN) 4 MG/0.1ML nasal spray; Spray 1 spray (4 mg) into one nostril alternating nostrils as needed for opioid reversal. every 2-3 minutes until assistance arrives  Dispense: 0.2 mL; Refill: 11  - buprenorphine HCl-naloxone HCl (SUBOXONE) 4-1 MG per film; Place 2 Film under the tongue 2 times daily.  Dispense: 60 Film; Refill: 0    2. Encounter for monitoring opioid maintenance therapy  - Drug Confirmation Panel Urine with Creat - lab collect; Future    3. Stimulant use disorder  Denies cravings or return to use.   Continue programming at [facility]  Avoid bupropion due to h/o seizure with previous trial    4. Tobacco use disorder  Not interested in quitting at this time    5. Depression with anxiety  Continues on fluoxetine 20mg/day.  Plans to start increased dose of 30mg/day prescribed by PCP in near future.   Taking clonidine and hydroxyzine daily prn.    Pt would benefit from individual therapy after treatment.      Return in 12 days (on 2024) for Follow up, in  "person.      Patient counseling completed today:  Discussed mechanism of action, potential risks/benefits/side effects of medications and other recommendations above.    Harm reduction counseling including never use alone, availability of naloxone, risks associated with concurrent use of opioids and benzodiazepines, alcohol, or other sedatives, safer administration as applicable.  Discussed importance of avoiding isolation, building a network of supportive relationships, avoiding people/places/things associated with past use to reduce risk of relapse; including motivational interviewing regarding psychosocial interventions.       SUBJECTIVE                                                        CC/HPI:  Rambo Scott is a 23 year old male with PMH seizure 24 after starting bupropion, depression  anxiety, tobacco use disorder, stimulant use disorder,  and opioid use disorder on buprenorphine who presents to the [facility] for return visit.      Brief History:  Rambo Scott was first seen in Recovery Clinic on 24. They were referred by [facility] to continue buprenorphine as he began IOP with [facility].  Continued buprenorphine 12mg/day through  at initial visit.   - Brief return to use after initial visit in setting of death of friend's sister  - 24 buprenorphine increased to 16mg/day      24 HPI:  Pt states he has been taking buprenorphine every day, taking 4mg QID most days, occasionally only TID.  He reports he occasionally swallows the dose vs letting it dissolve entirely.    He does endorse cravings recently, occurred after the recent  for his friend who  last month.  He states he has not been experiencing cravings in less extreme circumstances.   He is using Colace and Miralax prn OIC.   States his father called him today and told him his grandfather .  Reports he has a \"terrible\" relationship with his father, and this is the 2nd time he has talked to his dad in 3 " "years.    Pt is planning a road trip to [place] in 2 weeks.  States he may also need to go to [facility] for his grandfather's  in the future.   Still in programming at [facility].  Living at home with his mother.  Manages his own medications.   Pt's PCP recently increased dose of fluoxetine by adding a 10mg dose to 20mg.  Pt has not started this increased dose yet because he wants to be able to fill both rx's at the same time.   Taking clonidine and hydroxyzine at night.        Substance Use History :  Opioids:   Age at first use: [age] started using fentanyl. Was tired of being sick so he was began using Oxycodone 200 mg daily.   Current use: substance: Oxycodone; quantity 200 mg route: insufflation ; timing of last use: 24                IV drug use: No   History of overdose: No  Previous residential or outpatient treatments for addiction : Yes:  [facility]  Previous medication treatments for addiction: Yes: Suboxone  Longest period of sobriety: 24 to present  Medical complications related to substance use: none known  Hepatitis C: Negative on 23, reports more recent screening in 2024   HIV: Non-reactive on 23, reports more recent screening in 2024      Other Addiction History:  Stimulants   H/o daily cocaine use for 5 months prior to treatment  Sedatives/hypnotics/anxiolytics:   Occasional use  of Benzos, last use 2024  Alcohol:   Denies  Nicotine:   Vaping and smoking  Cannabis:   H/o of daily use, \"was a problem until going to treatment.\"  Hallucinogens/Dissociatives:   Has tried a few times  Eating disorder:  Denies  Gambling:              Once     PAST PSYCHIATRIC HISTORY:  Diagnoses- Depression and anxiety  Suicide Attempts: No   Hospitalizations: No           2024     3:00 PM 2024     3:00 PM 2024     2:00 PM   PHQ   PHQ-9 Total Score 9 6 10   Q9: Thoughts of better off dead/self-harm past 2 weeks Not at all Not at all Not at all       Social History  Housing " status: with parents  Education: HS graduate  Employment status: Unemployed, not seeking work  Relationship status: Partnered  Children: no children  Legal: denies         [place] Prescription Drug Monitoring Program Reviewed:  Yes  08/29/2024 08/29/2024 1 Buprenorphine-Nalox 4-1mg Film 56.00 14 Cl Max [id number] Paddy (1359) 0/0 16.00 mg [place]   08/16/2024 08/16/2024 1 Buprenorphine-Nalox 4-1mg Film 42.00 14 Ka Par [id number] Paddy (1359) 0/0 12.00 mg [place]   08/01/2024 08/01/2024 1 Buprenorphine-Nalox 8-2mg Film 21.00 14 He Bat [id number] Gra (7864) 0/0 12.00 mg [place]       Medications:  Current Outpatient Medications   Medication Sig Dispense Refill    cloNIDine (CATAPRES) 0.1 MG tablet Take 1 tablet (0.1 mg) by mouth 3 times daily as needed (anxiety) 45 tablet 0    docusate sodium (COLACE) 100 MG capsule Take 1 capsule (100 mg) by mouth 2 times daily. 60 capsule 1    FLUoxetine (PROZAC) 10 MG tablet Take 30 mg by mouth daily.      hydrOXYzine HCl (ATARAX) 25 MG tablet Take 1 tablet (25 mg) by mouth 3 times daily as needed for anxiety 45 tablet 0    naloxone (NARCAN) 4 MG/0.1ML nasal spray Spray 1 spray (4 mg) into one nostril alternating nostrils as needed for opioid reversal every 2-3 minutes until assistance arrives 0.2 mL 11    polyethylene glycol (MIRALAX) 17 GM/Dose powder Take 17 g (1 Capful) by mouth daily. 578 g 0     No current facility-administered medications for this visit.       No Known Allergies    PMH, PSH, FamHx reviewed      OBJECTIVE                                                      BP 99/62   Pulse 109   SpO2 97%     Physical Exam  Constitutional:       General: He is not in acute distress.     Appearance: Normal appearance.   Eyes:      Extraocular Movements: Extraocular movements intact.   Pulmonary:      Effort: Pulmonary effort is normal.   Neurological:      General: No focal deficit present.      Mental Status: He is alert and oriented to person, place, and time.    Psychiatric:         Attention and Perception: Attention normal.         Speech: Speech normal.         Behavior: Behavior is cooperative.         Thought Content: Thought content normal.         Cognition and Memory: Memory normal.      Comments: Mood is neutral, affect congruent with mood and subject matter  Insight and judgement are age appropriate         Labs:    UDS:    Lab Results   Component Value Date    BUP Screen Positive (A) 08/29/2024    BZO Negative 08/29/2024    BAR Negative 08/29/2024    DENNIS Negative 08/29/2024    MAMP Negative 08/29/2024    AMP Negative 08/29/2024    MDMA Negative 08/29/2024    MTD Negative 08/29/2024    [id number] Negative 08/29/2024    OXY Negative 08/29/2024    PCP Negative 08/29/2024    THC Negative 08/29/2024    TEMP 92 F 08/29/2024    SGPOCT 1.005 08/29/2024     *POC urine drug screen does not screen for Fentanyl      Recent Results (from the past 240 hour(s))   Drugs of Abuse Screen Urine (POC CUPS) POCT    Collection Time: 09/13/24  2:39 PM   Result Value Ref Range    POCT Kit Lot Number [id number]     POCT Kit Expiration Date 2026-05-15     Temperature Urine POCT 90 F 90 F, 92 F, 94 F, 96 F, 98 F, 100 F    Specific Monitor POCT 1.015 1.005, 1.015, 1.025    pH Qual Urine POCT 5 pH 4 pH, 5 pH, 7 pH, 9 pH    Creatinine Qual Urine POCT 50 mg/dL 20 mg/dL, 50 mg/dL, 100 mg/dL, 200 mg/dL    Internal QC Qual Urine POCT Valid Valid    Amphetamine Qual Urine POCT Negative Negative    Barbiturate Qual Urine POCT Negative Negative    Buprenorphine Qual Urine POCT Screen Positive (A) Negative    Benzodiazepine Qual Urine POCT Negative Negative    Cocaine Qual Urine POCT Negative Negative    Methamphetamine Qual Urine POCT Negative Negative    MDMA Qual Urine POCT Negative Negative    Methadone Qual Urine POCT Negative Negative    Opiate Qual Urine POCT Negative Negative    Oxycodone Qual Urine POCT Negative Negative    Phencyclidine Qual Urine POCT Negative Negative    THC Qual Urine POCT  Negative Negative     At least 40min spent on day of visit in review of medical record,  review, obtaining histories, discussing recommendations, counseling, coordination of care      Deanna Aragon MD  Addiction Medicine  [facility]  [address] [place] [address] [phone]

## 2024-09-13 NOTE — NURSING NOTE
[facility]      Rooming information:    Going on a trip coming up     Approximate last use of full opioid agonist: 8/14/2024  Taking buprenorphine? Yes:   How much per day? 16 mg - take 4 mg QID   Number of buprenorphine films/tablets remaining currently: 1 film  Side effects related to buprenorphine (constipation, dry mouth, sedation?) No   Narcan currently available: No  Other recent substance use:    Denies  NICOTINE-Yes: vape and cigs   If using nicotine, ready to quit? No    Point of care urine drug screen positive for:  Lab Results   Component Value Date    BUP Screen Positive (A) 08/29/2024    BZO Negative 08/29/2024    BAR Negative 08/29/2024    DENNIS Negative 08/29/2024    MAMP Negative 08/29/2024    AMP Negative 08/29/2024    MDMA Negative 08/29/2024    MTD Negative 08/29/2024    [id number] Negative 08/29/2024    OXY Negative 08/29/2024    PCP Negative 08/29/2024    THC Negative 08/29/2024    TEMP 92 F 08/29/2024    SGPOCT 1.005 08/29/2024       *POC urine drug screen does not screen for Fentanyl    Depression Response    Patient completed the PHQ-9 assessment for depression and scored >9? Yes  Question 9 on the PHQ-9 was positive for suicidality? No  Does patient have current mental health provider? No  C-SSRS screener risk level.       Is this a virtual visit? No          9/13/2024     2:00 PM   PHQ Assesment Total Score(s)   PHQ-9 Score 10       Housing needs: With mom [facility], [place]     Insurance: Active     Current legal issues: None     Contact information up to date? Yes    3rd Party Involvement: none today  (please obtain ELI if pt would like to include)    Dinah Vazquez RN  September 13, 2024  2:41 PM

## 2024-09-19 ENCOUNTER — TELEPHONE (OUTPATIENT)
Dept: BEHAVIORAL HEALTH | Facility: CLINIC | Age: 24
End: 2024-09-19
Payer: COMMERCIAL

## 2024-09-19 NOTE — TELEPHONE ENCOUNTER
Reason for call:  Other   Patient called regarding (reason for call): call back  Additional comments: pt will be going out of country in October for 3  weeks , pt will run out of meds by then and Is requesting meds be fill for those 3 weeks - please pt back at number file - thank you     Phone number to reach patient:  Home number on file [phone] (home)    Best Time:  any     Can we leave a detailed message on this number?  YES    Travel screening: Negative

## 2024-09-20 NOTE — TELEPHONE ENCOUNTER
RN called patient and LVM that provider was made aware of conversation yesterday and we will see him to follow-up on 9/25/24.    Dinah Vazquez RN on 9/20/2024 at 12:23 PM

## 2024-09-25 ENCOUNTER — OFFICE VISIT (OUTPATIENT)
Dept: BEHAVIORAL HEALTH | Facility: CLINIC | Age: 24
End: 2024-09-25
Payer: COMMERCIAL

## 2024-09-25 VITALS — HEART RATE: 78 BPM | DIASTOLIC BLOOD PRESSURE: 61 MMHG | SYSTOLIC BLOOD PRESSURE: 111 MMHG

## 2024-09-25 DIAGNOSIS — F15.90 STIMULANT USE DISORDER: ICD-10-CM

## 2024-09-25 DIAGNOSIS — F17.200 TOBACCO USE DISORDER: ICD-10-CM

## 2024-09-25 DIAGNOSIS — F41.8 DEPRESSION WITH ANXIETY: ICD-10-CM

## 2024-09-25 DIAGNOSIS — F11.20 OPIOID USE DISORDER, SEVERE, DEPENDENCE (H): Primary | ICD-10-CM

## 2024-09-25 PROCEDURE — 99214 OFFICE O/P EST MOD 30 MIN: CPT | Performed by: NURSE PRACTITIONER

## 2024-09-25 PROCEDURE — 80305 DRUG TEST PRSMV DIR OPT OBS: CPT | Performed by: NURSE PRACTITIONER

## 2024-09-25 PROCEDURE — G2211 COMPLEX E/M VISIT ADD ON: HCPCS | Performed by: NURSE PRACTITIONER

## 2024-09-25 RX ORDER — BUPRENORPHINE AND NALOXONE 4; 1 MG/1; MG/1
2 FILM, SOLUBLE BUCCAL; SUBLINGUAL 2 TIMES DAILY
Qty: 120 FILM | Refills: 0 | Status: SHIPPED | OUTPATIENT
Start: 2024-09-25

## 2024-09-25 RX ORDER — BUPRENORPHINE AND NALOXONE 4; 1 MG/1; MG/1
2 FILM, SOLUBLE BUCCAL; SUBLINGUAL 2 TIMES DAILY
Qty: 120 FILM | Refills: 0 | Status: CANCELLED | OUTPATIENT
Start: 2024-09-25

## 2024-09-25 RX ORDER — FLUOXETINE 10 MG/1
10 CAPSULE ORAL DAILY
Qty: 90 CAPSULE | Refills: 3 | Status: SHIPPED | OUTPATIENT
Start: 2024-09-25

## 2024-09-25 ASSESSMENT — PATIENT HEALTH QUESTIONNAIRE - PHQ9: SUM OF ALL RESPONSES TO PHQ QUESTIONS 1-9: 9

## 2024-09-25 NOTE — NURSING NOTE
[facility]      Rooming information:    Approximate last use of full opioid agonist: 8/14/24   Taking buprenorphine? Yes:   How much per day? 16mg  Number of buprenorphine films/tablets remaining currently: some  Side effects related to buprenorphine (constipation, dry mouth, sedation?) No   Narcan currently available: Yes  Other recent substance use:    Denies  NICOTINE-Yes: vape & cig  If using nicotine, ready to quit? No    Point of care urine drug screen positive for:  Lab Results   Component Value Date    BUP Screen Positive (A) 09/25/2024    BZO Negative 09/25/2024    BAR Negative 09/25/2024    DENNIS Negative 09/25/2024    MAMP Negative 09/25/2024    AMP Negative 09/25/2024    MDMA Negative 09/25/2024    MTD Negative 09/25/2024    [id number] Negative 09/25/2024    OXY Negative 09/25/2024    PCP Negative 09/25/2024    THC Negative 09/25/2024    TEMP 90 F 09/25/2024    SGPOCT 1.015 09/25/2024       *POC urine drug screen does not screen for Fentanyl        Depression Response    Patient completed the PHQ-9 assessment for depression and scored >9? Yes  Question 9 on the PHQ-9 was positive for suicidality? No  Does patient have current mental health provider? No  C-SSRS screener risk level.       Is this a virtual visit? No    I personally notified the following: [facility]          9/25/2024     2:00 PM   PHQ Assesment Total Score(s)   PHQ-9 Score 9         Housing needs:  With mom [facility]     Insurance: active    Current legal issues: none    Contact information up to date? yes    3rd Party Involvement no today (please obtain ELI if pt would like to include)    Brianna Helm MA  September 25, 2024  2:30 PM

## 2024-09-25 NOTE — PROGRESS NOTES
[facility] Follow Up    ASSESSMENT/PLAN                                                      1. Opioid use disorder, severe, dependence (H)  Reporting control of symptoms with 16 mg of suboxone. Is taking 4 mg films due to preferred taste.   Continue suboxone unchanged. Providing 30 day supply due to travel plans to [facility]. 10/5-10/24. Patient will present to  follow up on or around 10/25.   Confirms narcan access  Continue recovery supports.   - Drugs of Abuse Screen Urine (POC CUPS) POCT  - buprenorphine HCl-naloxone HCl (SUBOXONE) 4-1 MG per film; Place 2 Film under the tongue 2 times daily.  Dispense: 120 Film; Refill: 0    2. Stimulant use disorder  Is experiencing intermittent thoughts about using cocaine while in [facility]. Encouraged to have a plan while there if urges arise. Encouraged to talk to his mom, who is also in recovery,  if he begins to struggle with cravings. Researched available NA/AA meetings available, and encouraged to go to meetings while there.     3. Depression with anxiety  He saw his PCP in August who increased his Prozac to 30 mg daily. He has not been able to  his new rx due to insurance. Spoke with pharmacist at [facility] who reports current prescription needs to be clarified or new Rx needs to be sent. Confirmed that his insurance will not cover three 10 mg capsules daily. New rx sent for both 20 mg and 10 mg capsules.   - FLUoxetine (PROZAC) 10 MG capsule; Take 1 capsule (10 mg) by mouth daily. Take with 20 mg for a total of 30 mg daily  Dispense: 90 capsule; Refill: 3  - FLUoxetine (PROZAC) 20 MG capsule; Take 1 capsule (20 mg) by mouth daily. Take with 10 mg for a total of 30 mg daily  Dispense: 90 capsule; Refill: 3    4. Tobacco use disorder  Using ENDS and smoking, is not interested in quitting at this time.            Return in about 1 month (around 10/25/2024) for Follow up, in person walk in.      Patient counseling completed  today:  Discussed mechanism of action, potential risks/benefits/side effects of medications and other recommendations above.     Discussed risk of precipitated withdrawal with initiation of buprenorphine in the presence of full opioid agonists.    Reviewed directions for initiation of buprenorphine to reduce risk of precipitated withdrawal and maximize efficacy.    Harm reduction counseling including never use alone, availability of naloxone, risks associated with concurrent use of opioids and benzodiazepines, alcohol, or other sedatives, safer administration as applicable.  Discussed importance of avoiding isolation, building a network of supportive relationships, avoiding people/places/things associated with past use to reduce risk of relapse; including motivational interviewing regarding psychosocial interventions.   SUBJECTIVE                                                              CC/HPI:  Rambo Scott is a 23 year old male with PMH seizure 24 after starting bupropion, depression  anxiety, tobacco use disorder, stimulant use disorder,  and opioid use disorder on buprenorphine who presents to the [facility] for return visit.       Brief History:  Rambo Scott was first seen in Recovery Clinic on 24. They were referred by [facility] to continue buprenorphine as he began IOP with [facility].  Continued buprenorphine 12mg/day through  at initial visit.   - Brief return to use after initial visit in setting of death of friend's sister  - 24 buprenorphine increased to 16mg/day    Recommendations last visit: 2024  1. Opioid use disorder, severe, dependence (H)  Pt reports last use of full opioid agonist remains 24.  Endorses cravings in setting of death of friend and her recent .  Is not interested in increasing TDD of buprenorphine.    Reports occasionally swallowing Suboxone film.    Recommended consolidating doses to help reduce risk of taking subtherapeutic dose.     Pt firmly wants to continue 4mg films due to intolerance of taste of higher strength films.   Reviewed importance of sublingual administration.   Pt would benefit from XR buprenorphine.  He is interested in transfer to [facility] in the future, not now.    Continue programming at [facility]  Naloxone prescribed  Continue colace, Miralax prn OIC  - Drugs of Abuse Screen Urine (POC CUPS) POCT  - Drug Confirmation Panel Urine with Creat - lab collect; Future  - naloxone (NARCAN) 4 MG/0.1ML nasal spray; Spray 1 spray (4 mg) into one nostril alternating nostrils as needed for opioid reversal. every 2-3 minutes until assistance arrives  Dispense: 0.2 mL; Refill: 11  - buprenorphine HCl-naloxone HCl (SUBOXONE) 4-1 MG per film; Place 2 Film under the tongue 2 times daily.  Dispense: 60 Film; Refill: 0     2. Encounter for monitoring opioid maintenance therapy  - Drug Confirmation Panel Urine with Creat - lab collect; Future     3. Stimulant use disorder  Denies cravings or return to use.   Continue programming at [facility]  Avoid bupropion due to h/o seizure with previous trial     4. Tobacco use disorder  Not interested in quitting at this time     5. Depression with anxiety  Continues on fluoxetine 20mg/day.  Plans to start increased dose of 30mg/day prescribed by PCP in near future.   Taking clonidine and hydroxyzine daily prn.    Pt would benefit from individual therapy after treatment.       24 HPI:  Is leaving for [facility] next week to attend his grandfather's . Will be returning 10/24  Is having some thoughts about     Substance Use History :  Opioids:   Age at first use: [age] started using fentanyl. Was tired of being sick so he was began using Oxycodone 200 mg daily.   Current use: substance: Oxycodone; quantity 200 mg route: insufflation ; timing of last use: 24                IV drug use: No   History of overdose: No  Previous residential or outpatient treatments for addiction : Yes:   "[facility]  Previous medication treatments for addiction: Yes: Suboxone  Longest period of sobriety: 8/14/24 to present  Medical complications related to substance use: none known  Hepatitis C: Negative on 7/17/23, reports more recent screening in July 2024   HIV: Non-reactive on 7/17/23, reports more recent screening in July 2024      Other Addiction History:  Stimulants   H/o daily cocaine use for 5 months prior to treatment  Sedatives/hypnotics/anxiolytics:   Occasional use  of Benzos, last use 6/2024  Alcohol:   Denies  Nicotine:   Vaping and smoking  Cannabis:   H/o of daily use, \"was a problem until going to treatment.\"  Hallucinogens/Dissociatives:   Has tried a few times  Eating disorder:  Denies  Gambling:              Once     PAST PSYCHIATRIC HISTORY:  Diagnoses- Depression and anxiety  Suicide Attempts: No   Hospitalizations: No       8/29/2024     3:00 PM 9/13/2024     2:00 PM 9/25/2024     2:00 PM   PHQ   PHQ-9 Total Score 6 10 9   Q9: Thoughts of better off dead/self-harm past 2 weeks Not at all Not at all Not at all     Social History  Housing status: with parents  Education: HS graduate  Employment status: Unemployed, not seeking work  Relationship status: Partnered  Children: no children  Legal: denies         Labs discussed with patient?  Yes      [place] Prescription Drug Monitoring Program Reviewed:  Yes                 09/13/2024 09/13/2024 1 Buprenorphine-Nalox 4-1mg Film 60.00 15 Li Vol [id number] Paddy (1359) 0/0 16.00 mg Comm Ins [place]   08/29/2024 08/29/2024 1 Buprenorphine-Nalox 4-1mg Film 56.00 14 Cl Max [id number] Paddy (1359) 0/0 16.00 mg [place]   08/16/2024 08/16/2024 1 Buprenorphine-Nalox 4-1mg Film 42.00 14 Ka Par [id number] Paddy (1359) 0/0 12.00 mg [place]   08/01/2024 08/01/2024 1 Buprenorphine-Nalox 8-2mg Film 21.00 14 He Bat               Medications:  Current Outpatient Medications   Medication Sig Dispense Refill    buprenorphine HCl-naloxone HCl (SUBOXONE) 4-1 MG per film Place 2 " Film under the tongue 2 times daily. 120 Film 0    FLUoxetine (PROZAC) 10 MG capsule Take 1 capsule (10 mg) by mouth daily. Take with 20 mg for a total of 30 mg daily 90 capsule 3    FLUoxetine (PROZAC) 20 MG capsule Take 1 capsule (20 mg) by mouth daily. Take with 10 mg for a total of 30 mg daily 90 capsule 3    cloNIDine (CATAPRES) 0.1 MG tablet Take 1 tablet (0.1 mg) by mouth 3 times daily as needed (anxiety) 45 tablet 0    docusate sodium (COLACE) 100 MG capsule Take 1 capsule (100 mg) by mouth 2 times daily. (Patient not taking: Reported on 9/13/2024) 60 capsule 1    FLUoxetine (PROZAC) 10 MG tablet Take 30 mg by mouth daily.      hydrOXYzine HCl (ATARAX) 25 MG tablet Take 1 tablet (25 mg) by mouth 3 times daily as needed for anxiety 45 tablet 0    naloxone (NARCAN) 4 MG/0.1ML nasal spray Spray 1 spray (4 mg) into one nostril alternating nostrils as needed for opioid reversal. every 2-3 minutes until assistance arrives 0.2 mL 11    polyethylene glycol (MIRALAX) 17 GM/Dose powder Take 17 g (1 Capful) by mouth daily. (Patient not taking: Reported on 9/13/2024) 578 g 0     No current facility-administered medications for this visit.       No Known Allergies    PMH, PSH, FamHx reviewed      OBJECTIVE                                                      /61   Pulse 78     Physical Exam  Cardiovascular:      Rate and Rhythm: Normal rate.   Pulmonary:      Effort: Pulmonary effort is normal. No respiratory distress.   Neurological:      General: No focal deficit present.      Mental Status: He is alert and oriented to person, place, and time.   Psychiatric:         Attention and Perception: Attention normal.         Mood and Affect: Mood normal.         Speech: Speech normal.         Behavior: Behavior is cooperative.         Thought Content: Thought content normal. Thought content does not include suicidal ideation.         Judgment: Judgment normal.         Labs:    UDS:    Lab Results   Component Value Date     BUP Screen Positive (A) 09/25/2024    BZO Negative 09/25/2024    BAR Negative 09/25/2024    DENNIS Negative 09/25/2024    MAMP Negative 09/25/2024    AMP Negative 09/25/2024    MDMA Negative 09/25/2024    MTD Negative 09/25/2024    [id number] Negative 09/25/2024    OXY Negative 09/25/2024    PCP Negative 09/25/2024    THC Negative 09/25/2024    TEMP 90 F 09/25/2024    SGPOCT 1.015 09/25/2024     *POC urine drug screen does not screen for Fentanyl      Recent Results (from the past 240 hour(s))   Drugs of Abuse Screen Urine (POC CUPS) POCT    Collection Time: 09/25/24  2:36 PM   Result Value Ref Range    POCT Kit Lot Number [id number]     POCT Kit Expiration Date 6855846     Temperature Urine POCT 90 F 90 F, 92 F, 94 F, 96 F, 98 F, 100 F    Specific [place] POCT 1.015 1.005, 1.015, 1.025    pH Qual Urine POCT 7 pH 4 pH, 5 pH, 7 pH, 9 pH    Creatinine Qual Urine POCT 20 mg/dL 20 mg/dL, 50 mg/dL, 100 mg/dL, 200 mg/dL    Internal QC Qual Urine POCT Valid Valid    Amphetamine Qual Urine POCT Negative Negative    Barbiturate Qual Urine POCT Negative Negative    Buprenorphine Qual Urine POCT Screen Positive (A) Negative    Benzodiazepine Qual Urine POCT Negative Negative    Cocaine Qual Urine POCT Negative Negative    Methamphetamine Qual Urine POCT Negative Negative    MDMA Qual Urine POCT Negative Negative    Methadone Qual Urine POCT Negative Negative    Opiate Qual Urine POCT Negative Negative    Oxycodone Qual Urine POCT Negative Negative    Phencyclidine Qual Urine POCT Negative Negative    THC Qual Urine POCT Negative Negative                Continued Complex Management  The longitudinal plan of care for Opioid Use Disorder (OUD) and Stimulant Use Disorder was addressed during this visit. Due to the added complexity in care, I will continue to support Thor in the subsequent management and with ongoing continuity of care.    Santa Wilburn, CNP    [facility] [place] [id number] [place] [address]  [id number]  [phone]

## 2024-10-06 ENCOUNTER — HEALTH MAINTENANCE LETTER (OUTPATIENT)
Age: 24
End: 2024-10-06

## 2024-10-25 ENCOUNTER — OFFICE VISIT (OUTPATIENT)
Dept: BEHAVIORAL HEALTH | Facility: CLINIC | Age: 24
End: 2024-10-25
Payer: COMMERCIAL

## 2024-10-25 VITALS — DIASTOLIC BLOOD PRESSURE: 69 MMHG | SYSTOLIC BLOOD PRESSURE: 107 MMHG | HEART RATE: 97 BPM

## 2024-10-25 DIAGNOSIS — F15.90 STIMULANT USE DISORDER: ICD-10-CM

## 2024-10-25 DIAGNOSIS — F41.8 DEPRESSION WITH ANXIETY: ICD-10-CM

## 2024-10-25 DIAGNOSIS — F17.200 TOBACCO USE DISORDER: ICD-10-CM

## 2024-10-25 DIAGNOSIS — F11.20 OPIOID USE DISORDER, SEVERE, DEPENDENCE (H): Primary | ICD-10-CM

## 2024-10-25 DIAGNOSIS — Z79.891 ENCOUNTER FOR MONITORING OPIOID MAINTENANCE THERAPY: ICD-10-CM

## 2024-10-25 DIAGNOSIS — Z51.81 ENCOUNTER FOR MONITORING OPIOID MAINTENANCE THERAPY: ICD-10-CM

## 2024-10-25 LAB
AMPHETAMINE QUAL URINE POCT: NEGATIVE
BARBITURATE QUAL URINE POCT: NEGATIVE
BENZODIAZEPINE QUAL URINE POCT: ABNORMAL
BUPRENORPHINE QUAL URINE POCT: ABNORMAL
COCAINE QUAL URINE POCT: ABNORMAL
CREAT UR-MCNC: 145 MG/DL
CREATININE QUAL URINE POCT: ABNORMAL
INTERNAL QC QUAL URINE POCT: ABNORMAL
MDMA QUAL URINE POCT: NEGATIVE
METHADONE QUAL URINE POCT: NEGATIVE
METHAMPHETAMINE QUAL URINE POCT: NEGATIVE
OPIATE QUAL URINE POCT: NEGATIVE
OXYCODONE QUAL URINE POCT: ABNORMAL
PH QUAL URINE POCT: ABNORMAL
PHENCYCLIDINE QUAL URINE POCT: NEGATIVE
POCT KIT EXPIRATION DATE: ABNORMAL
POCT KIT LOT NUMBER: ABNORMAL
SPECIFIC GRAVITY POCT: 1.02
TEMPERATURE URINE POCT: ABNORMAL
THC QUAL URINE POCT: ABNORMAL

## 2024-10-25 PROCEDURE — G0481 DRUG TEST DEF 8-14 CLASSES: HCPCS | Performed by: FAMILY MEDICINE

## 2024-10-25 PROCEDURE — 99215 OFFICE O/P EST HI 40 MIN: CPT | Performed by: FAMILY MEDICINE

## 2024-10-25 PROCEDURE — 80305 DRUG TEST PRSMV DIR OPT OBS: CPT | Performed by: FAMILY MEDICINE

## 2024-10-25 PROCEDURE — G2211 COMPLEX E/M VISIT ADD ON: HCPCS | Performed by: FAMILY MEDICINE

## 2024-10-25 RX ORDER — CLONIDINE HYDROCHLORIDE 0.1 MG/1
0.1 TABLET ORAL 3 TIMES DAILY PRN
Qty: 45 TABLET | Refills: 0 | Status: SHIPPED | OUTPATIENT
Start: 2024-10-25

## 2024-10-25 RX ORDER — HYDROXYZINE HYDROCHLORIDE 25 MG/1
25 TABLET, FILM COATED ORAL 3 TIMES DAILY PRN
Qty: 45 TABLET | Refills: 0 | Status: SHIPPED | OUTPATIENT
Start: 2024-10-25

## 2024-10-25 RX ORDER — BUPRENORPHINE AND NALOXONE 12; 3 MG/1; MG/1
1 FILM, SOLUBLE BUCCAL; SUBLINGUAL DAILY
Qty: 15 FILM | Refills: 0 | Status: SHIPPED | OUTPATIENT
Start: 2024-10-25

## 2024-10-25 RX ORDER — BUPRENORPHINE AND NALOXONE 8; 2 MG/1; MG/1
1 FILM, SOLUBLE BUCCAL; SUBLINGUAL DAILY
Qty: 15 FILM | Refills: 0 | Status: SHIPPED | OUTPATIENT
Start: 2024-10-25

## 2024-10-25 ASSESSMENT — PATIENT HEALTH QUESTIONNAIRE - PHQ9: SUM OF ALL RESPONSES TO PHQ QUESTIONS 1-9: 19

## 2024-10-25 NOTE — NURSING NOTE
[facility]      Rooming information:  RESTARTED SUBOXONE 10/24/24 AFTER 1-1.5 WEEK OF NOT TAKING K, APPROX 10P LAST NIGHT 2MG.  Approximate last use of full opioid agonist: RELAPSED LAST USE 10/24/24  Taking buprenorphine? Yes: SUBOXONE   How much per day? 16mg  Number of buprenorphine films/tablets remaining currently: 0  Side effects related to buprenorphine (constipation, dry mouth, sedation?) No   Narcan currently available: Yes  Other recent substance use:    Denies  NICOTINE-Yes: vape & cigs  If using nicotine, ready to quit? No    Point of care urine drug screen positive for:  Lab Results   Component Value Date    BUP Screen Positive (A) 10/25/2024    BZO Screen Positive (A) 10/25/2024    BAR Negative 10/25/2024    DENNIS Screen Positive (A) 10/25/2024    MAMP Negative 10/25/2024    AMP Negative 10/25/2024    MDMA Negative 10/25/2024    MTD Negative 10/25/2024    [id number] Negative 10/25/2024    OXY Screen Positive (A) 10/25/2024    PCP Negative 10/25/2024    THC Screen Positive (A) 10/25/2024    TEMP 90 F 10/25/2024    SGPOCT 1.025 10/25/2024       *POC urine drug screen does not screen for Fentanyl      Depression Response    Patient completed the PHQ-9 assessment for depression and scored >9? Yes  Question 9 on the PHQ-9 was positive for suicidality? No  Does patient have current mental health provider? No  C-SSRS screener risk level.       Is this a virtual visit? No    I personally notified the following: NA          10/25/2024     2:00 PM   PHQ Assesment Total Score(s)   PHQ-9 Score 19         Housing needs: stable with mom- [facility]    Insurance: active    Current legal issues: none    Contact information up to date? yes    3rd Party Involvement  [facility]END[facility]JIM[facility]- ELI COMPLETED 10/25/24 FOR IN CLINIC VISITS ONLY (please obtain ELI if pt would like to include)    Brianna Helm MA  October 25, 2024  2:03 PM

## 2024-10-25 NOTE — PROGRESS NOTES
[facility] Follow Up    ASSESSMENT/PLAN                                                    1. Opioid use disorder, severe, dependence (H) (Primary)  S/p return to use of oxycodone up to 320mg/day for 1 1/2 weeks, last use 10/24/24.  Pt states he took 2mg buprenorphine later in the day 10/24/24 and 6mg total so far today.   Pt discontinued buprenorphine prior to his return to use.   Increase buprenorphine to 20mg/day  Discuss recommendation for transfer to [facility] again at follow-up visit.   Recommend updated JILL evaluation and follow recommendations.  Pt met with [facility] to discuss arrangements for this.   Harm reduction counseling including never use alone, availability of naloxone.   Additional naloxone prescribed  - Drugs of Abuse Screen Urine (POC CUPS) POCT  - Drug Confirmation Panel Urine with Creat - lab collect; Future  - Drug Confirmation Panel Urine with Creat - lab collect  - Buprenorphine HCl-Naloxone HCl (SUBOXONE) 12-3 MG FILM per film; Place 1 Film under the tongue daily.  Dispense: 15 Film; Refill: 0  - buprenorphine HCl-naloxone HCl (SUBOXONE) 8-2 MG per film; Place 1 Film under the tongue daily.  Dispense: 15 Film; Refill: 0  - naloxone (NARCAN) 4 MG/0.1ML nasal spray; Spray 1 spray (4 mg) into one nostril alternating nostrils as needed for opioid reversal. every 2-3 minutes until assistance arrives  Dispense: 0.2 mL; Refill: 11    2. Encounter for monitoring opioid maintenance therapy    - Drug Confirmation Panel Urine with Creat - lab collect; Future  - Drug Confirmation Panel Urine with Creat - lab collect    3. Depression with anxiety  Refilled hydroxyzine and clonidine  Continue fluoxetine 30mg/day, no rx required  - hydrOXYzine HCl (ATARAX) 25 MG tablet; Take 1 tablet (25 mg) by mouth 3 times daily as needed for anxiety.  Dispense: 45 tablet; Refill: 0  - cloNIDine (CATAPRES) 0.1 MG tablet; Take 1 tablet (0.1 mg) by mouth 3 times daily as needed (anxiety).  Dispense:  45 tablet; Refill: 0    4. Stimulant use disorder  UDS+cocaine.  Pt eventually did recall exposure during return to use in [facility].  Recommend updated JILL evaluation and follow recommendations.  Avoid bupropion due to h/o seizures.     5. Tobacco use disorder  Smoking and vaping, not interested in quitting at this time    Return in about 1 week (around 11/1/2024).      Patient counseling completed today:  Discussed mechanism of action, potential risks/benefits/side effects of medications and other recommendations above.    Harm reduction counseling including never use alone, availability of naloxone, risks associated with concurrent use of opioids and benzodiazepines, alcohol, or other sedatives, safer administration as applicable.  Discussed importance of avoiding isolation, building a network of supportive relationships, avoiding people/places/things associated with past use to reduce risk of relapse; including motivational interviewing regarding psychosocial interventions.   SUBJECTIVE                                                         CC/HPI:  Rambo Scott is a 23 year old male with PMH seizure 7/25/24 after starting bupropion, depression  anxiety, tobacco use disorder, stimulant use disorder,  and opioid use disorder on buprenorphine who presents to the [facility] for return visit.       Brief History:  Rambo Scott was first seen in Recovery Clinic on 08/01/24. They were referred by [facility] to continue buprenorphine as he began IOP with [facility].  Continued buprenorphine 12mg/day through  at initial visit.   - Brief return to use after initial visit in setting of death of friend's sister  - 8/29/24 buprenorphine increased to 16mg/day    Recommendations last visit: 9/25/2024  1. Opioid use disorder, severe, dependence (H)  Reporting control of symptoms with 16 mg of suboxone. Is taking 4 mg films due to preferred taste.   Continue suboxone unchanged. Providing 30 day supply due to travel  plans to [facility]. 10/5-10/24. Patient will present to  follow up on or around 10/25.   Confirms narcan access  Continue recovery supports.   - Drugs of Abuse Screen Urine (POC CUPS) POCT  - buprenorphine HCl-naloxone HCl (SUBOXONE) 4-1 MG per film; Place 2 Film under the tongue 2 times daily.  Dispense: 120 Film; Refill: 0     2. Stimulant use disorder  Is experiencing intermittent thoughts about using cocaine while in [facility]. Encouraged to have a plan while there if urges arise. Encouraged to talk to his mom, who is also in recovery,  if he begins to struggle with cravings. Researched available NA/AA meetings available, and encouraged to go to meetings while there.      3. Depression with anxiety  He saw his PCP in August who increased his Prozac to 30 mg daily. He has not been able to  his new rx due to insurance. Spoke with pharmacist at [facility] who reports current prescription needs to be clarified or new Rx needs to be sent. Confirmed that his insurance will not cover three 10 mg capsules daily. New rx sent for both 20 mg and 10 mg capsules.   - FLUoxetine (PROZAC) 10 MG capsule; Take 1 capsule (10 mg) by mouth daily. Take with 20 mg for a total of 30 mg daily  Dispense: 90 capsule; Refill: 3  - FLUoxetine (PROZAC) 20 MG capsule; Take 1 capsule (20 mg) by mouth daily. Take with 10 mg for a total of 30 mg daily  Dispense: 90 capsule; Refill: 3     4. Tobacco use disorder  Using ENDS and smoking, is not interested in quitting at this time.                 Return in about 1 month (around 10/25/2024) for Follow up, in person walk in.         10/25/24 HPI:  Pt returns to clinic one month from last visit as recommended.  He states he stopped taking buprenorphine about 1 1/2 weeks ago while in [facility] and used oxycodone, up to 320mg/day, last use 10/24/24.  He states he did take 2mg buprenorphine yesterday and experienced mild nausea, and took total of 6mg buprenorphine this morning.  He goes on to say he  "planned this return to use, and packed fentanyl test strips with him when he left for the trip.  He did test the substances he used at least once for fentanyl and they were negative.  He is initially surprised by the presence of cocaine and benzodiazepines on his UDS today, but during the course of the visit he said he remembered where those results came from.    He was discharged from [facility] when he left for [facility] due to length of his time away.  He is open to returning to treatment.  His mother discovered his return to use yesterday, and pt's girlfriend accompanying him today said pt's mother is wondering if outpatient is adequate.    Pt does endorse strong cravings for opioids currently.  He recalls presence of cravings before his return to use also.    He reports he has been taking fluoxetine 30mg/day, states he experiences some sweatiness taking fluoxetine but that is not new.      Substance Use History :  Opioids:   Age at first use: [age] started using fentanyl. Was tired of being sick so he was began using Oxycodone 200 mg daily.   Current use: substance: Oxycodone; quantity 200 mg route: insufflation ; timing of last use: 10/24/24                IV drug use: No   History of overdose: No  Previous residential or outpatient treatments for addiction : Yes:  [facility]  Previous medication treatments for addiction: Yes: Suboxone  Longest period of sobriety: 8/14/24 to present  Medical complications related to substance use: none known  Hepatitis C: Negative on 7/17/23, reports more recent screening in July 2024   HIV: Non-reactive on 7/17/23, reports more recent screening in July 2024      Other Addiction History:  Stimulants   H/o daily cocaine use for 5 months prior to treatment; last use 10/2024  Sedatives/hypnotics/anxiolytics:   Occasional use  of Benzos, last use 10/2024  Alcohol:   Denies  Nicotine:   Vaping and smoking  Cannabis:   H/o of daily use, \"was a problem until going to " "treatment.\"  Hallucinogens/Dissociatives:   Has tried a few times  Eating disorder:  Denies  Gambling:              Once     PAST PSYCHIATRIC HISTORY:  Diagnoses- Depression and anxiety  Suicide Attempts: No   Hospitalizations: No       9/13/2024     2:00 PM 9/25/2024     2:00 PM 10/25/2024     2:00 PM   PHQ   PHQ-9 Total Score 10 9 19   Q9: Thoughts of better off dead/self-harm past 2 weeks Not at all Not at all Not at all     Social History  Housing status: with parents  Education: HS graduate  Employment status: Unemployed, not seeking work  Relationship status: Partnered  Children: no children  Legal: denies         [place] Prescription Drug Monitoring Program Reviewed:  Yes  09/26/2024 09/25/2024 3 Buprenorphine-Nalox 4-1mg Film 120.00 30 He Bat [id number] Paddy (1849) 0/0 16.00 mg Comm Ins [place]   09/13/2024 09/13/2024 3 Buprenorphine-Nalox 4-1mg Film 60.00 15 Li Vol [id number]              Medications:  Current Outpatient Medications   Medication Sig Dispense Refill    buprenorphine HCl-naloxone HCl (SUBOXONE) 4-1 MG per film Place 2 Film under the tongue 2 times daily. 120 Film 0    cloNIDine (CATAPRES) 0.1 MG tablet Take 1 tablet (0.1 mg) by mouth 3 times daily as needed (anxiety) 45 tablet 0    docusate sodium (COLACE) 100 MG capsule Take 1 capsule (100 mg) by mouth 2 times daily. (Patient not taking: Reported on 9/13/2024) 60 capsule 1    FLUoxetine (PROZAC) 10 MG capsule Take 1 capsule (10 mg) by mouth daily. Take with 20 mg for a total of 30 mg daily 90 capsule 3    FLUoxetine (PROZAC) 20 MG capsule Take 1 capsule (20 mg) by mouth daily. Take with 10 mg for a total of 30 mg daily 90 capsule 3    hydrOXYzine HCl (ATARAX) 25 MG tablet Take 1 tablet (25 mg) by mouth 3 times daily as needed for anxiety 45 tablet 0    naloxone (NARCAN) 4 MG/0.1ML nasal spray Spray 1 spray (4 mg) into one nostril alternating nostrils as needed for opioid reversal. every 2-3 minutes until assistance arrives 0.2 mL 11    polyethylene " glycol (MIRALAX) 17 GM/Dose powder Take 17 g (1 Capful) by mouth daily. (Patient not taking: Reported on 9/13/2024) 578 g 0     No current facility-administered medications for this visit.       No Known Allergies    PMH, PSH, FamHx reviewed      OBJECTIVE                                                      /69   Pulse 97     Physical Exam  Cardiovascular:      Rate and Rhythm: Normal rate.   Pulmonary:      Effort: Pulmonary effort is normal. No respiratory distress.   Neurological:      General: No focal deficit present.      Mental Status: He is alert and oriented to person, place, and time.   Psychiatric:         Attention and Perception: Attention normal.         Mood and Affect: Mood normal.         Speech: Speech normal.         Behavior: Behavior is cooperative.         Thought Content: Thought content normal. Thought content does not include suicidal ideation.         Judgment: Judgment normal.         Labs:    UDS:    Lab Results   Component Value Date    BUP Screen Positive (A) 10/25/2024    BZO Screen Positive (A) 10/25/2024    BAR Negative 10/25/2024    DENNIS Screen Positive (A) 10/25/2024    MAMP Negative 10/25/2024    AMP Negative 10/25/2024    MDMA Negative 10/25/2024    MTD Negative 10/25/2024    [id number] Negative 10/25/2024    OXY Screen Positive (A) 10/25/2024    PCP Negative 10/25/2024    THC Screen Positive (A) 10/25/2024    TEMP 90 F 10/25/2024    SGPOCT 1.025 10/25/2024     *POC urine drug screen does not screen for Fentanyl      Recent Results (from the past 240 hours)   Drugs of Abuse Screen Urine (POC CUPS) POCT    Collection Time: 10/25/24  2:19 PM   Result Value Ref Range    POCT Kit Lot Number [id number]     POCT Kit Expiration Date [id number]     Temperature Urine POCT 90 F 90 F, 92 F, 94 F, 96 F, 98 F, 100 F    Specific [place] POCT 1.025 1.005, 1.015, 1.025    pH Qual Urine POCT 5 pH 4 pH, 5 pH, 7 pH, 9 pH    Creatinine Qual Urine POCT 50 mg/dL 20 mg/dL, 50 mg/dL, 100 mg/dL, 200 mg/dL     Internal QC Qual Urine POCT Valid Valid    Amphetamine Qual Urine POCT Negative Negative    Barbiturate Qual Urine POCT Negative Negative    Buprenorphine Qual Urine POCT Screen Positive (A) Negative    Benzodiazepine Qual Urine POCT Screen Positive (A) Negative    Cocaine Qual Urine POCT Screen Positive (A) Negative    Methamphetamine Qual Urine POCT Negative Negative    MDMA Qual Urine POCT Negative Negative    Methadone Qual Urine POCT Negative Negative    Opiate Qual Urine POCT Negative Negative    Oxycodone Qual Urine POCT Screen Positive (A) Negative    Phencyclidine Qual Urine POCT Negative Negative    THC Qual Urine POCT Screen Positive (A) Negative       At least 40min spent on day of visit in review of medical record,  review, obtaining histories, discussing recommendations, counseling, coordination of care    The longitudinal plan of care for the diagnosis(es)/condition(s) as documented were addressed during this visit. Due to the added complexity in care, I will continue to support Thor in the subsequent management and with ongoing continuity of care.      Deanna Aragon MD  Addiction Medicine  [place] [id number] [address] [address] [phone]

## 2024-10-31 LAB
BUPRENORPHINE UR CFM-MCNC: 249 NG/ML
BUPRENORPHINE/CREAT UR: 172 NG/MG {CREAT}
BZE UR CFM-MCNC: 313 NG/ML
BZE/CREAT UR: 216 NG/MG {CREAT}
NALOXONE UR CFM-MCNC: 722 NG/ML
NALOXONE: 498 NG/MG {CREAT}
NORBUPRENORPHINE UR CFM-MCNC: 445 NG/ML
NORBUPRENORPHINE/CREAT UR: 307 NG/MG {CREAT}
OXAZEPAM UR QL CFM: PRESENT
OXYCODONE UR CFM-MCNC: 91 NG/ML
OXYCODONE/CREAT UR: 63 NG/MG {CREAT}
OXYMORPHONE UR CFM-MCNC: 333 NG/ML
OXYMORPHONE/CREAT UR: 230 NG/MG {CREAT}

## 2024-11-25 ENCOUNTER — OFFICE VISIT (OUTPATIENT)
Dept: BEHAVIORAL HEALTH | Facility: CLINIC | Age: 24
End: 2024-11-25
Payer: COMMERCIAL

## 2024-11-25 VITALS
SYSTOLIC BLOOD PRESSURE: 116 MMHG | DIASTOLIC BLOOD PRESSURE: 68 MMHG | WEIGHT: 147.3 LBS | OXYGEN SATURATION: 98 % | HEART RATE: 101 BPM | BODY MASS INDEX: 18.9 KG/M2 | HEIGHT: 74 IN

## 2024-11-25 DIAGNOSIS — F11.20 OPIOID USE DISORDER, SEVERE, DEPENDENCE (H): Primary | ICD-10-CM

## 2024-11-25 DIAGNOSIS — F15.90 STIMULANT USE DISORDER: ICD-10-CM

## 2024-11-25 DIAGNOSIS — F41.8 DEPRESSION WITH ANXIETY: ICD-10-CM

## 2024-11-25 LAB
AMPHETAMINE QUAL URINE POCT: NEGATIVE
BARBITURATE QUAL URINE POCT: NEGATIVE
BENZODIAZEPINE QUAL URINE POCT: NEGATIVE
BUPRENORPHINE QUAL URINE POCT: ABNORMAL
COCAINE QUAL URINE POCT: NEGATIVE
CREAT UR-MCNC: 117 MG/DL
CREATININE QUAL URINE POCT: ABNORMAL
INTERNAL QC QUAL URINE POCT: ABNORMAL
MDMA QUAL URINE POCT: NEGATIVE
METHADONE QUAL URINE POCT: NEGATIVE
METHAMPHETAMINE QUAL URINE POCT: NEGATIVE
OPIATE QUAL URINE POCT: NEGATIVE
OXYCODONE QUAL URINE POCT: NEGATIVE
PH QUAL URINE POCT: ABNORMAL
PHENCYCLIDINE QUAL URINE POCT: NEGATIVE
POCT KIT EXPIRATION DATE: ABNORMAL
POCT KIT LOT NUMBER: ABNORMAL
SPECIFIC GRAVITY POCT: 1.01
TEMPERATURE URINE POCT: ABNORMAL
THC QUAL URINE POCT: ABNORMAL

## 2024-11-25 RX ORDER — BUPRENORPHINE AND NALOXONE 12; 3 MG/1; MG/1
1 FILM, SOLUBLE BUCCAL; SUBLINGUAL DAILY
Qty: 15 FILM | Refills: 0 | Status: SHIPPED | OUTPATIENT
Start: 2024-11-25

## 2024-11-25 RX ORDER — BUPRENORPHINE AND NALOXONE 8; 2 MG/1; MG/1
1 FILM, SOLUBLE BUCCAL; SUBLINGUAL DAILY
Qty: 15 FILM | Refills: 0 | Status: SHIPPED | OUTPATIENT
Start: 2024-11-25

## 2024-11-25 ASSESSMENT — PATIENT HEALTH QUESTIONNAIRE - PHQ9: SUM OF ALL RESPONSES TO PHQ QUESTIONS 1-9: 12

## 2024-11-25 ASSESSMENT — PAIN SCALES - GENERAL: PAINLEVEL_OUTOF10: NO PAIN (0)

## 2024-11-25 NOTE — NURSING NOTE
[facility]      Rooming information:  Needs refill for Vistaril and Clonidine  Approximate last use of full opioid agonist: 10/24/24  Taking buprenorphine? Yes:  How much per day? 20  Number of buprenorphine films/tablets remaining currently: 0  Side effects related to buprenorphine (constipation, dry mouth, sedation?) Yes: constipation   Narcan currently available: Yes  Other recent substance use:   THC - mostly CBD for sleep  NICOTINE-Yes: 2-3 cigs a day +  vape  If using nicotine, ready to quit? No    Point of care urine drug screen positive for:  Lab Results   Component Value Date    BUP Screen Positive (A) 11/25/2024    BZO Negative 11/25/2024    BAR Negative 11/25/2024    DENNIS Negative 11/25/2024    MAMP Negative 11/25/2024    AMP Negative 11/25/2024    MDMA Negative 11/25/2024    MTD Negative 11/25/2024    [id number] Negative 11/25/2024    OXY Negative 11/25/2024    PCP Negative 11/25/2024    THC Screen Positive (A) 11/25/2024    TEMP 92 F 11/25/2024    SGPOCT 1.015 11/25/2024       *POC urine drug screen does not screen for Fentanyl    Pregnancy Status     Depression Response    Patient completed the PHQ-9 assessment for depression and scored >9? Yes  Question 9 on the PHQ-9 was positive for suicidality? No  Does patient have current mental health provider? Yes  C-SSRS screener risk level.                 11/25/2024     2:00 PM   PHQ Assesment Total Score(s)   PHQ-9 Score 12         Housing needs: stable    Insurance: active    Current legal issues: none    Contact information up to date? yes    3rd Party Involvement NA (please obtain ELI if pt would like to include)    Chel Brasher LPN  November 25, 2024  3:03 PM

## 2024-11-25 NOTE — PROGRESS NOTES
[facility] Follow Up    ASSESSMENT/PLAN                                                      1. Opioid use disorder, severe, dependence (H) (Primary)  Patient reporting cravings are controlled and denies use, reports last use was 10/24/2024. Is taking 20 mg of suboxone and has started IOP at [facility].   Continue suboxone unchanged  Continue programming at [facility] and follow recommendations  Continue meeting attendance  Confirms narcan access  - Drugs of Abuse Screen Urine (POC CUPS) POCT  - Drug Confirmation Panel Urine with Creat - lab collect; Future  - Buprenorphine HCl-Naloxone HCl (SUBOXONE) 12-3 MG FILM per film; Place 1 Film under the tongue daily.  Dispense: 15 Film; Refill: 0  - buprenorphine HCl-naloxone HCl (SUBOXONE) 8-2 MG per film; Place 1 Film under the tongue daily.  Dispense: 15 Film; Refill: 0  - Drug Confirmation Panel Urine with Creat - lab collect    2. Stimulant use disorder  Denies cravings or return to use.   Continue programming at [facility]  Continue meeting attendance    3. Depression with anxiety  Increased depression after relapse. Is taking fluoxetine 30 mg daily and taking hydroxyzine as needed.   Continue fluoxetine unchanged, no refill needed. Has hydroxyzine available.   Referred to therapy with [facility]     Return in about 8 days (around 12/3/2024) for Follow up, in person 1230.      Patient counseling completed today:  Discussed mechanism of action, potential risks/benefits/side effects of medications and other recommendations above.     Discussed risk of precipitated withdrawal with initiation of buprenorphine in the presence of full opioid agonists.    Reviewed directions for initiation of buprenorphine to reduce risk of precipitated withdrawal and maximize efficacy.    Harm reduction counseling including never use alone, availability of naloxone, risks associated with concurrent use of opioids and benzodiazepines, alcohol, or other sedatives, safer administration as  applicable.  Discussed importance of avoiding isolation, building a network of supportive relationships, avoiding people/places/things associated with past use to reduce risk of relapse; including motivational interviewing regarding psychosocial interventions.   SUBJECTIVE                                                        CC/HPI:  Rambo Scott is a 23 year old male with PMH seizure 7/25/24 after starting bupropion, depression  anxiety, tobacco use disorder, stimulant use disorder,  and opioid use disorder on buprenorphine who presents to the Recovery Clinic for return visit.       Brief History:  Rambo Scott was first seen in Recovery Clinic on 08/01/24. They were referred by [facility] to continue buprenorphine as he began IOP with [facility].  Continued buprenorphine 12mg/day through  at initial visit.   - Brief return to use after initial visit in setting of death of friend's sister  - 8/29/24 buprenorphine increased to 16mg/day    Recommendations last visit: 10/25/2024  1. Opioid use disorder, severe, dependence (H) (Primary)  S/p return to use of oxycodone up to 320mg/day for 1 1/2 weeks, last use 10/24/24.  Pt states he took 2mg buprenorphine later in the day 10/24/24 and 6mg total so far today.   Pt discontinued buprenorphine prior to his return to use.   Increase buprenorphine to 20mg/day  [facility].   Recommend updated J[facility] evaluation and follow recommendations.  Pt met with [facility] to discuss arrangements for this.   Harm reduction counseling including never use alone, availability of naloxone.   Additional naloxone prescribed  - Drugs of Abuse Screen Urine (POC CUPS) POCT  - Drug Confirmation Panel Urine with Creat - lab collect; Future  - Drug Confirmation Panel Urine with Creat - lab collect  - Buprenorphine HCl-Naloxone HCl (SUBOXONE) 12-3 MG FILM per film; Place 1 Film under the tongue daily.  Dispense: 15 Film; Refill: 0  -  "buprenorphine HCl-naloxone HCl (SUBOXONE) 8-2 MG per film; Place 1 Film under the tongue daily.  Dispense: 15 Film; Refill: 0  - naloxone (NARCAN) 4 MG/0.1ML nasal spray; Spray 1 spray (4 mg) into one nostril alternating nostrils as needed for opioid reversal. every 2-3 minutes until assistance arrives  Dispense: 0.2 mL; Refill: 11     2. Encounter for monitoring opioid maintenance therapy     - Drug Confirmation Panel Urine with Creat - lab collect; Future  - Drug Confirmation Panel Urine with Creat - lab collect     3. Depression with anxiety  Refilled hydroxyzine and clonidine  Continue fluoxetine 30mg/day, no rx required  - hydrOXYzine HCl (ATARAX) 25 MG tablet; Take 1 tablet (25 mg) by mouth 3 times daily as needed for anxiety.  Dispense: 45 tablet; Refill: 0  - cloNIDine (CATAPRES) 0.1 MG tablet; Take 1 tablet (0.1 mg) by mouth 3 times daily as needed (anxiety).  Dispense: 45 tablet; Refill: 0     4. Stimulant use disorder  UDS+cocaine.  Pt eventually did recall exposure during return to use in [facility].  Recommend updated JILL evaluation and follow recommendations.  Avoid bupropion due to h/o seizures.      5. Tobacco use disorder  Smoking and vaping, not interested in quitting at this time     11/25/24 HPI:  Denies return to use, since 10/24/2024.  It is a progressive disease, when he relapsed used same amount he was using prior to quitting, worse withdrwal. It has been 1 month since his use, and is still feeling like \"I can't breathe.\" The uncomforetability, dread of life. Unable to thnik anout the furture with hope. Feeling sad about relapse  Ran out of suboxone yesterday, Has been consistently taking 20 mg daily until the last couple of days.   Started IOP at [facility], living with SO  Is going to meetings daily.       Substance Use History :  Opioids:   Age at first use: [age] started using fentanyl. Was tired of being sick so he was began using Oxycodone 200 mg daily.   Current use: substance: Oxycodone; " "quantity 200 mg route: insufflation ; timing of last use: 10/24/24                IV drug use: No   History of overdose: No  Previous residential or outpatient treatments for addiction : Yes:  [facility]  Previous medication treatments for addiction: Yes: Suboxone  Longest period of sobriety: 8/14/24 to present  Medical complications related to substance use: none known  Hepatitis C: Negative on 7/17/23, reports more recent screening in July 2024   HIV: Non-reactive on 7/17/23, reports more recent screening in July 2024      Other Addiction History:  Stimulants   H/o daily cocaine use for 5 months prior to treatment; last use 10/2024  Sedatives/hypnotics/anxiolytics:   Occasional use  of Benzos, last use 10/2024  Alcohol:   Denies  Nicotine:   Vaping and smoking  Cannabis:   H/o of daily use, \"was a problem until going to treatment.\"  Hallucinogens/Dissociatives:   Has tried a few times  Eating disorder:  Denies  Gambling:              Once     PAST PSYCHIATRIC HISTORY:  Diagnoses- Depression and anxiety  Suicide Attempts: No   Hospitalizations: No       9/25/2024     2:00 PM 10/25/2024     2:00 PM 11/25/2024     2:00 PM   PHQ   PHQ-9 Total Score 9 19 12   Q9: Thoughts of better off dead/self-harm past 2 weeks Not at all Not at all Not at all     Social History  Housing status: with parents  Education: HS graduate  Employment status: Unemployed, not seeking work  Relationship status: Partnered  Children: no children  Legal: denies         Labs discussed with patient?  Yes      [place] Prescription Drug Monitoring Program Reviewed:  Yes  10/28/2024 10/25/2024 1 Buprenorphine-Nalox 8-2mg Film 15.00 15 Li Vol [id number] Paddy ([id number]) 0/0 8.00 mg [place]   10/28/2024 10/25/2024 1 Buprenorphine-Nalox 12-3mg Flm 15.00 15 Li Vol [id number] Paddy ([id number]) 0/0 12.00 mg [place]   09/26/2024 09/25/2024 1 Buprenorphine-Nalox 4-1mg Film 120.00 30 He Bat             Medications:  Current Outpatient Medications   Medication Sig " "Dispense Refill    Buprenorphine HCl-Naloxone HCl (SUBOXONE) 12-3 MG FILM per film Place 1 Film under the tongue daily. 15 Film 0    buprenorphine HCl-naloxone HCl (SUBOXONE) 8-2 MG per film Place 1 Film under the tongue daily. 15 Film 0    cloNIDine (CATAPRES) 0.1 MG tablet Take 1 tablet (0.1 mg) by mouth 3 times daily as needed (anxiety). 45 tablet 0    docusate sodium (COLACE) 100 MG capsule Take 1 capsule (100 mg) by mouth 2 times daily. 60 capsule 1    FLUoxetine (PROZAC) 10 MG capsule Take 1 capsule (10 mg) by mouth daily. Take with 20 mg for a total of 30 mg daily 90 capsule 3    FLUoxetine (PROZAC) 20 MG capsule Take 1 capsule (20 mg) by mouth daily. Take with 10 mg for a total of 30 mg daily 90 capsule 3    hydrOXYzine HCl (ATARAX) 25 MG tablet Take 1 tablet (25 mg) by mouth 3 times daily as needed for anxiety. 45 tablet 0    polyethylene glycol (MIRALAX) 17 GM/Dose powder Take 17 g (1 Capful) by mouth daily. 578 g 0    naloxone (NARCAN) 4 MG/0.1ML nasal spray Spray 1 spray (4 mg) into one nostril alternating nostrils as needed for opioid reversal. every 2-3 minutes until assistance arrives 0.2 mL 11     No current facility-administered medications for this visit.       No Known Allergies    PMH, PSH, FamHx reviewed      OBJECTIVE                                                      /68 (BP Location: Right arm, Patient Position: Sitting, Cuff Size: Adult Regular)   Pulse 101   Ht 1.88 m (6' 2\")   Wt 66.8 kg (147 lb 4.8 oz)   SpO2 98%   BMI 18.91 kg/m      Physical Exam  Cardiovascular:      Rate and Rhythm: Normal rate.   Pulmonary:      Effort: Pulmonary effort is normal. No respiratory distress.   Neurological:      General: No focal deficit present.      Mental Status: He is alert and oriented to person, place, and time.   Psychiatric:         Attention and Perception: Attention normal.         Mood and Affect: Mood normal.         Speech: Speech normal.         Behavior: Behavior is " cooperative.         Thought Content: Thought content normal. Thought content does not include suicidal ideation.         Judgment: Judgment normal.         Labs:    UDS:    Lab Results   Component Value Date    BUP Screen Positive (A) 11/25/2024    BZO Negative 11/25/2024    BAR Negative 11/25/2024    DENNIS Negative 11/25/2024    MAMP Negative 11/25/2024    AMP Negative 11/25/2024    MDMA Negative 11/25/2024    MTD Negative 11/25/2024    [id number] Negative 11/25/2024    OXY Negative 11/25/2024    PCP Negative 11/25/2024    THC Screen Positive (A) 11/25/2024    TEMP 92 F 11/25/2024    SGPOCT 1.015 11/25/2024     *POC urine drug screen does not screen for Fentanyl      Recent Results (from the past 240 hours)   Drugs of Abuse Screen Urine (POC CUPS) POCT    Collection Time: 11/25/24  2:53 PM   Result Value Ref Range    POCT Kit Lot Number [id number]     POCT Kit Expiration Date 2026-07-14     Temperature Urine POCT 92 F 90 F, 92 F, 94 F, 96 F, 98 F, 100 F    Specific [place] POCT 1.015 1.005, 1.015, 1.025    pH Qual Urine POCT 5 pH 4 pH, 5 pH, 7 pH, 9 pH    Creatinine Qual Urine POCT 200 mg/dL 20 mg/dL, 50 mg/dL, 100 mg/dL, 200 mg/dL    Internal QC Qual Urine POCT Valid Valid    Amphetamine Qual Urine POCT Negative Negative    Barbiturate Qual Urine POCT Negative Negative    Buprenorphine Qual Urine POCT Screen Positive (A) Negative    Benzodiazepine Qual Urine POCT Negative Negative    Cocaine Qual Urine POCT Negative Negative    Methamphetamine Qual Urine POCT Negative Negative    MDMA Qual Urine POCT Negative Negative    Methadone Qual Urine POCT Negative Negative    Opiate Qual Urine POCT Negative Negative    Oxycodone Qual Urine POCT Negative Negative    Phencyclidine Qual Urine POCT Negative Negative    THC Qual Urine POCT Screen Positive (A) Negative   Urine Creatinine for Drug Screen Panel    Collection Time: 11/25/24  4:07 PM   Result Value Ref Range    Creatinine Urine for Drug Screen 117 mg/dL               Continued Complex Management  The longitudinal plan of care for Opioid Use Disorder (OUD) and Stimulant Use Disorder was addressed during this visit. Due to the added complexity in care, I will continue to support Thor in the subsequent management and with ongoing continuity of care.    Santa Wilburn, [place] [id number] [address] [address]  [phone]

## 2024-11-26 DIAGNOSIS — F41.8 DEPRESSION WITH ANXIETY: ICD-10-CM

## 2024-11-26 NOTE — TELEPHONE ENCOUNTER
Hydroxyzine and clonidine refill requests.    Last seen in the Recovery Clinic: 11/25/24  Next appt: 12/3/24    Last rxs:   hydrOXYzine HCl (ATARAX) 25 MG tablet 45 tablet 0 10/25/2024 -- No   Sig - Route: Take 1 tablet (25 mg) by mouth 3 times daily as needed for anxiety. - Oral   Sent to pharmacy as: hydrOXYzine HCl 25 MG Oral Tablet (ATARAX)     cloNIDine (CATAPRES) 0.1 MG tablet 45 tablet 0 10/25/2024 -- No   Sig - Route: Take 1 tablet (0.1 mg) by mouth 3 times daily as needed (anxiety). - Oral     Routing to Santa Wilburn.    Lore Love RN on 11/26/2024 at 1:07 PM

## 2024-11-27 ENCOUNTER — TELEPHONE (OUTPATIENT)
Dept: BEHAVIORAL HEALTH | Facility: CLINIC | Age: 24
End: 2024-11-27
Payer: COMMERCIAL

## 2024-11-27 LAB
BUPRENORPHINE UR CFM-MCNC: 131 NG/ML
BUPRENORPHINE/CREAT UR: 112 NG/MG {CREAT}
NALOXONE UR CFM-MCNC: 142 NG/ML
NALOXONE: 121 NG/MG {CREAT}
NORBUPRENORPHINE UR CFM-MCNC: 1508 NG/ML
NORBUPRENORPHINE/CREAT UR: 1289 NG/MG {CREAT}

## 2024-11-27 RX ORDER — CLONIDINE HYDROCHLORIDE 0.1 MG/1
0.1 TABLET ORAL 3 TIMES DAILY PRN
Qty: 45 TABLET | Refills: 0 | Status: SHIPPED | OUTPATIENT
Start: 2024-11-27

## 2024-11-27 RX ORDER — HYDROXYZINE HYDROCHLORIDE 25 MG/1
25 TABLET, FILM COATED ORAL 3 TIMES DAILY PRN
Qty: 45 TABLET | Refills: 0 | Status: SHIPPED | OUTPATIENT
Start: 2024-11-27

## 2024-11-27 NOTE — TELEPHONE ENCOUNTER
This writer called the patient and left a message for him to call the clinic to schedule therapy visit to see if meeting with me would be a good fit.

## 2024-12-06 ENCOUNTER — OFFICE VISIT (OUTPATIENT)
Dept: BEHAVIORAL HEALTH | Facility: CLINIC | Age: 24
End: 2024-12-06
Payer: COMMERCIAL

## 2024-12-06 VITALS — DIASTOLIC BLOOD PRESSURE: 65 MMHG | SYSTOLIC BLOOD PRESSURE: 98 MMHG | HEART RATE: 99 BPM

## 2024-12-06 DIAGNOSIS — F17.200 TOBACCO USE DISORDER: ICD-10-CM

## 2024-12-06 DIAGNOSIS — Z51.81 ENCOUNTER FOR MONITORING OPIOID MAINTENANCE THERAPY: ICD-10-CM

## 2024-12-06 DIAGNOSIS — F12.90 CANNABIS USE DISORDER: ICD-10-CM

## 2024-12-06 DIAGNOSIS — Z79.891 ENCOUNTER FOR MONITORING OPIOID MAINTENANCE THERAPY: ICD-10-CM

## 2024-12-06 DIAGNOSIS — F11.20 OPIOID USE DISORDER, SEVERE, DEPENDENCE (H): Primary | ICD-10-CM

## 2024-12-06 LAB
AMPHETAMINE QUAL URINE POCT: NEGATIVE
BARBITURATE QUAL URINE POCT: NEGATIVE
BENZODIAZEPINE QUAL URINE POCT: NEGATIVE
BUPRENORPHINE QUAL URINE POCT: ABNORMAL
COCAINE QUAL URINE POCT: NEGATIVE
CREATININE QUAL URINE POCT: ABNORMAL
INTERNAL QC QUAL URINE POCT: ABNORMAL
MDMA QUAL URINE POCT: NEGATIVE
METHADONE QUAL URINE POCT: NEGATIVE
METHAMPHETAMINE QUAL URINE POCT: NEGATIVE
OPIATE QUAL URINE POCT: NEGATIVE
OXYCODONE QUAL URINE POCT: NEGATIVE
PH QUAL URINE POCT: ABNORMAL
PHENCYCLIDINE QUAL URINE POCT: NEGATIVE
POCT KIT EXPIRATION DATE: ABNORMAL
POCT KIT LOT NUMBER: ABNORMAL
SPECIFIC GRAVITY POCT: 1
TEMPERATURE URINE POCT: ABNORMAL
THC QUAL URINE POCT: ABNORMAL

## 2024-12-06 RX ORDER — BUPRENORPHINE AND NALOXONE 12; 3 MG/1; MG/1
1 FILM, SOLUBLE BUCCAL; SUBLINGUAL DAILY
Qty: 15 FILM | Refills: 0 | Status: SHIPPED | OUTPATIENT
Start: 2024-12-06

## 2024-12-06 RX ORDER — BUPRENORPHINE AND NALOXONE 8; 2 MG/1; MG/1
1 FILM, SOLUBLE BUCCAL; SUBLINGUAL DAILY
Qty: 15 FILM | Refills: 0 | Status: SHIPPED | OUTPATIENT
Start: 2024-12-06

## 2024-12-06 ASSESSMENT — PATIENT HEALTH QUESTIONNAIRE - PHQ9: SUM OF ALL RESPONSES TO PHQ QUESTIONS 1-9: 9

## 2024-12-06 NOTE — NURSING NOTE
[facility]      Rooming information:    Approximate last use of full opioid agonist: 10/24/2024  Taking buprenorphine? Yes:   How much per day? 20mg  Number of buprenorphine films/tablets remaining currently: 6 films 3 of  the 8-2 mg and 3 of the 12-3 mg  Side effects related to buprenorphine (constipation, dry mouth, sedation?) Yes: constipation   Narcan currently available: Yes  Other recent substance use:    Denies  NICOTINE-Yes: vape and cigarettes   If using nicotine, ready to quit? No    Point of care urine drug screen positive for:  Lab Results   Component Value Date    BUP Screen Positive (A) 12/06/2024    BZO Negative 12/06/2024    BAR Negative 12/06/2024    DENNIS Negative 12/06/2024    MAMP Negative 12/06/2024    AMP Negative 12/06/2024    MDMA Negative 12/06/2024    MTD Negative 12/06/2024    [id number] Negative 12/06/2024    OXY Negative 12/06/2024    PCP Negative 12/06/2024    THC Screen Positive (A) 12/06/2024    TEMP 92 F 12/06/2024    SGPOCT 1.005 12/06/2024       *POC urine drug screen does not screen for Fentanyl    Depression Response    Patient completed the PHQ-9 assessment for depression and scored >9? No  Question 9 on the PHQ-9 was positive for suicidality? No  Does patient have current mental health provider? Yes  C-SSRS screener risk level.       Is this a virtual visit? No    I personally notified the following: n/a          12/6/2024     3:00 PM   PHQ Assesment Total Score(s)   PHQ-9 Score 9     Housing needs: stable    Insurance: active    Current legal issues: none    Contact information up to date? yes    3rd Party Involvement not today (please obtain ELI if pt would like to include)    Paul Rouse MA  December 6, 2024  3:08 PM

## 2024-12-06 NOTE — PROGRESS NOTES
[facility] Follow Up    ASSESSMENT/PLAN                                                    1. Opioid use disorder, severe, dependence (H) (Primary)  Reporting control of symptoms  Continue buprenorphine 20mg/day  Recommended Miralax daily prn OIC  Continue programming with [facility]  - Drugs of Abuse Screen Urine (POC CUPS) POCT  - Buprenorphine HCl-Naloxone HCl (SUBOXONE) 12-3 MG FILM per film; Place 1 Film under the tongue daily.  Dispense: 15 Film; Refill: 0  - buprenorphine HCl-naloxone HCl (SUBOXONE) 8-2 MG per film; Place 1 Film under the tongue daily.  Dispense: 15 Film; Refill: 0    2. Encounter for monitoring opioid maintenance therapy  - Drugs of Abuse Screen Urine (POC CUPS) POCT    3. Cannabis use disorder  Start NAC as noted.  Discussed retail sources for NAC if unable to fill at pharmacy.   Continue programming at [facility]  - acetylcysteine (N-ACETYL CYSTEINE) 600 MG CAPS capsule; Take 2 capsules (1,200 mg) by mouth 2 times daily.  Dispense: 120 capsule; Refill: 11    4. Tobacco use disorder  Not interested in quitting at this time       Return in about 2 weeks (around 12/20/2024).      Patient counseling completed today:  Discussed mechanism of action, potential risks/benefits/side effects of medications and other recommendations above.    Harm reduction counseling including never use alone, availability of naloxone, risks associated with concurrent use of opioids and benzodiazepines, alcohol, or other sedatives, safer administration as applicable.  Discussed importance of avoiding isolation, building a network of supportive relationships, avoiding people/places/things associated with past use to reduce risk of relapse; including motivational interviewing regarding psychosocial interventions.   SUBJECTIVE                                                        CC/HPI:  Rambo Rosinacoralukeyair is a 24 year old male with PMH seizure 7/25/24 after starting bupropion, depression,  anxiety,  tobacco use disorder, stimulant use disorder,  and opioid use disorder on buprenorphine who presents to the Recovery Clinic for return visit.       Brief History:  Rambo Scott was first seen in Recovery Clinic on 08/01/24. They were referred by Sienna to continue buprenorphine as he began IOP with [facility].  Continued buprenorphine 12mg/day through  at initial visit.   - Brief return to use after initial visit in setting of death of friend's sister  - 8/29/24 buprenorphine increased to 16mg/day  - 1 1/2 week return to use of opioids and other substances 10/2024 while on trip to [facility]  - 10/25/24 buprenorphine increased to 20mg/day.  Started programming with [facility]      12/6/24 HPI:  Pt returns 2 weeks from last visit as recommended.  He has continued to take buprenorphine 20mg/day as prescribed.  He states opioid cravings have improved significantly.  He is still in IOP at [facility].  He is experiencing constipation.  Has Miralax and plans to start using it.   He would like to stop use of cannabis and is having difficulty with this.    States his moods have been stable.        Substance Use History :  Opioids:   Age at first use: [age] started using fentanyl. Was tired of being sick so he was began using Oxycodone 200 mg daily.   Current use: substance: Oxycodone; quantity 200 mg route: insufflation ; timing of last use: 10/24/24                IV drug use: No   History of overdose: No  Previous residential or outpatient treatments for addiction : Yes:  [facility]  Previous medication treatments for addiction: Yes: Suboxone  Longest period of sobriety: 8/14/24 to present  Medical complications related to substance use: none known  Hepatitis C: Negative on 7/17/23, reports more recent screening in July 2024   HIV: Non-reactive on 7/17/23, reports more recent screening in July 2024      Other Addiction History:  Stimulants   H/o daily cocaine use for 5 months prior to treatment; last use  "10/2024  Sedatives/hypnotics/anxiolytics:   Occasional use  of Benzos, last use 10/2024  Alcohol:   Denies  Nicotine:   Vaping and smoking  Cannabis:   H/o of daily use, \"was a problem until going to treatment.\"  Hallucinogens/Dissociatives:   Has tried a few times  Eating disorder:  Denies  Gambling:              Once     PAST PSYCHIATRIC HISTORY:  Diagnoses- Depression and anxiety  Suicide Attempts: No   Hospitalizations: No       10/25/2024     2:00 PM 11/25/2024     2:00 PM 12/6/2024     3:00 PM   PHQ   PHQ-9 Total Score 19 12 9   Q9: Thoughts of better off dead/self-harm past 2 weeks Not at all Not at all Not at all     Social History  Housing status: with his girlfriend  Education: HS graduate  Employment status: Unemployed, not seeking work  Relationship status: Partnered  Children: no children  Legal: denies         [place] Prescription Drug Monitoring Program Reviewed:  Yes  11/25/2024 11/25/2024 1 Buprenorphine-Nalox 12-3mg Flm 15.00 15 He Bat [id number] Paddy ([id number]) 0/0 12.00 mg [place] Ins [place]   11/25/2024 11/25/2024 1 Buprenorphine-Nalox 8-2mg Film 15.00 15 He Bat [id number] Paddy (1           Medications:  Current Outpatient Medications   Medication Sig Dispense Refill    Buprenorphine HCl-Naloxone HCl (SUBOXONE) 12-3 MG FILM per film Place 1 Film under the tongue daily. 15 Film 0    buprenorphine HCl-naloxone HCl (SUBOXONE) 8-2 MG per film Place 1 Film under the tongue daily. 15 Film 0    cloNIDine (CATAPRES) 0.1 MG tablet Take 1 tablet (0.1 mg) by mouth 3 times daily as needed (anxiety). 45 tablet 0    docusate sodium (COLACE) 100 MG capsule Take 1 capsule (100 mg) by mouth 2 times daily. 60 capsule 1    FLUoxetine (PROZAC) 10 MG capsule Take 1 capsule (10 mg) by mouth daily. Take with 20 mg for a total of 30 mg daily 90 capsule 3    FLUoxetine (PROZAC) 20 MG capsule Take 1 capsule (20 mg) by mouth daily. Take with 10 mg for a total of 30 mg daily 90 capsule 3    hydrOXYzine HCl (ATARAX) 25 MG tablet Take 1 " tablet (25 mg) by mouth 3 times daily as needed for anxiety. 45 tablet 0    polyethylene glycol (MIRALAX) 17 GM/Dose powder Take 17 g (1 Capful) by mouth daily. 578 g 0    naloxone (NARCAN) 4 MG/0.1ML nasal spray Spray 1 spray (4 mg) into one nostril alternating nostrils as needed for opioid reversal. every 2-3 minutes until assistance arrives 0.2 mL 11     No current facility-administered medications for this visit.       No Known Allergies    PMH, PSH, FamHx reviewed      OBJECTIVE                                                      BP 98/65 (BP Location: Left arm, Patient Position: Sitting, Cuff Size: Adult Regular)   Pulse 99     Physical Exam  Constitutional:       General: He is not in acute distress.  Eyes:      General: No scleral icterus.     Extraocular Movements: Extraocular movements intact.      Conjunctiva/sclera: Conjunctivae normal.   Pulmonary:      Effort: Pulmonary effort is normal.   Neurological:      General: No focal deficit present.      Mental Status: He is alert and oriented to person, place, and time.   Psychiatric:         Attention and Perception: Attention normal.         Mood and Affect: Mood normal.         Speech: Speech normal.         Behavior: Behavior is cooperative.         Thought Content: Thought content normal. Thought content does not include suicidal ideation.         Judgment: Judgment normal.         Labs:    *POC urine drug screen does not screen for Fentanyl      Recent Results (from the past 240 hours)   Drugs of Abuse Screen Urine (POC CUPS) POCT    Collection Time: 12/06/24  3:09 PM   Result Value Ref Range    POCT Kit Lot Number [id number]     POCT Kit Expiration Date 2026-07-14     Temperature Urine POCT 92 F 90 F, 92 F, 94 F, 96 F, 98 F, 100 F    Specific [place] POCT 1.005 1.005, 1.015, 1.025    pH Qual Urine POCT 7 pH 4 pH, 5 pH, 7 pH, 9 pH    Creatinine Qual Urine POCT 50 mg/dL 20 mg/dL, 50 mg/dL, 100 mg/dL, 200 mg/dL    Internal QC Qual Urine POCT Valid Valid     Amphetamine Qual Urine POCT Negative Negative    Barbiturate Qual Urine POCT Negative Negative    Buprenorphine Qual Urine POCT Screen Positive (A) Negative    Benzodiazepine Qual Urine POCT Negative Negative    Cocaine Qual Urine POCT Negative Negative    Methamphetamine Qual Urine POCT Negative Negative    MDMA Qual Urine POCT Negative Negative    Methadone Qual Urine POCT Negative Negative    Opiate Qual Urine POCT Negative Negative    Oxycodone Qual Urine POCT Negative Negative    Phencyclidine Qual Urine POCT Negative Negative    THC Qual Urine POCT Screen Positive (A) Negative             The longitudinal plan of care for the diagnosis(es)/condition(s) as documented were addressed during this visit. Due to the added complexity in care, I will continue to support Thor in the subsequent management and with ongoing continuity of care.      Deanna Aragon MD  Addiction Medicine  [place] [id number] [address] [address] [phone]

## 2024-12-26 ENCOUNTER — OFFICE VISIT (OUTPATIENT)
Dept: BEHAVIORAL HEALTH | Facility: CLINIC | Age: 24
End: 2024-12-26
Payer: COMMERCIAL

## 2024-12-26 VITALS — HEART RATE: 64 BPM | SYSTOLIC BLOOD PRESSURE: 108 MMHG | DIASTOLIC BLOOD PRESSURE: 72 MMHG

## 2024-12-26 DIAGNOSIS — F41.8 DEPRESSION WITH ANXIETY: ICD-10-CM

## 2024-12-26 DIAGNOSIS — F11.20 OPIOID USE DISORDER, SEVERE, DEPENDENCE (H): ICD-10-CM

## 2024-12-26 DIAGNOSIS — Z79.891 ENCOUNTER FOR MONITORING OPIOID MAINTENANCE THERAPY: ICD-10-CM

## 2024-12-26 DIAGNOSIS — Z51.81 ENCOUNTER FOR MONITORING OPIOID MAINTENANCE THERAPY: ICD-10-CM

## 2024-12-26 LAB
AMPHETAMINE QUAL URINE POCT: NEGATIVE
BARBITURATE QUAL URINE POCT: NEGATIVE
BENZODIAZEPINE QUAL URINE POCT: NEGATIVE
BUPRENORPHINE QUAL URINE POCT: ABNORMAL
COCAINE QUAL URINE POCT: NEGATIVE
CREATININE QUAL URINE POCT: ABNORMAL
INTERNAL QC QUAL URINE POCT: ABNORMAL
MDMA QUAL URINE POCT: NEGATIVE
METHADONE QUAL URINE POCT: NEGATIVE
METHAMPHETAMINE QUAL URINE POCT: NEGATIVE
OPIATE QUAL URINE POCT: NEGATIVE
OXYCODONE QUAL URINE POCT: NEGATIVE
PH QUAL URINE POCT: ABNORMAL
PHENCYCLIDINE QUAL URINE POCT: NEGATIVE
POCT KIT EXPIRATION DATE: ABNORMAL
POCT KIT LOT NUMBER: ABNORMAL
SPECIFIC GRAVITY POCT: 1.01
TEMPERATURE URINE POCT: ABNORMAL
THC QUAL URINE POCT: ABNORMAL

## 2024-12-26 RX ORDER — HYDROXYZINE HYDROCHLORIDE 25 MG/1
25 TABLET, FILM COATED ORAL 3 TIMES DAILY PRN
Qty: 45 TABLET | Refills: 0 | Status: SHIPPED | OUTPATIENT
Start: 2024-12-26

## 2024-12-26 RX ORDER — BUPRENORPHINE AND NALOXONE 8; 2 MG/1; MG/1
1 FILM, SOLUBLE BUCCAL; SUBLINGUAL DAILY
Qty: 15 FILM | Refills: 0 | Status: SHIPPED | OUTPATIENT
Start: 2024-12-26

## 2024-12-26 RX ORDER — CLONIDINE HYDROCHLORIDE 0.1 MG/1
0.1 TABLET ORAL 3 TIMES DAILY PRN
Qty: 45 TABLET | Refills: 0 | Status: SHIPPED | OUTPATIENT
Start: 2024-12-26

## 2024-12-26 RX ORDER — BUPRENORPHINE AND NALOXONE 12; 3 MG/1; MG/1
1 FILM, SOLUBLE BUCCAL; SUBLINGUAL DAILY
Qty: 15 FILM | Refills: 0 | Status: SHIPPED | OUTPATIENT
Start: 2024-12-26

## 2024-12-26 ASSESSMENT — PATIENT HEALTH QUESTIONNAIRE - PHQ9: SUM OF ALL RESPONSES TO PHQ QUESTIONS 1-9: 11

## 2024-12-26 NOTE — PROGRESS NOTES
[facility] Follow Up    ASSESSMENT/PLAN                                                    1. Opioid use disorder, severe, dependence (H)  ~ 2 months from a brief return to use. Is back in IOP at [facility] and is taking 20 mg of suboxone. Reporting symptoms are controlled.   Continue suboxone unchanged  Continue programming at [facility] and follow recommendations  Continue weekly meeting attendance  Consider updating hepatic profile.   - Comprehensive metabolic panel (BMP + Alb, Alk Phos, ALT, AST, Total. Bili, TP)  - Drugs of Abuse Screen Urine (POC CUPS) POCT  - Buprenorphine HCl-Naloxone HCl (SUBOXONE) 12-3 MG FILM per film; Place 1 Film under the tongue daily.  Dispense: 15 Film; Refill: 0  - buprenorphine HCl-naloxone HCl (SUBOXONE) 8-2 MG per film; Place 1 Film under the tongue daily.  Dispense: 15 Film; Refill: 0    2. Encounter for monitoring opioid maintenance therapy  - Drugs of Abuse Screen Urine (POC CUPS) POCT    3. Depression with anxiety  Reporting stable mood, continue clonidine and hydroxyzine  - hydrOXYzine HCl (ATARAX) 25 MG tablet; Take 1 tablet (25 mg) by mouth 3 times daily as needed for anxiety.  Dispense: 45 tablet; Refill: 0  - cloNIDine (CATAPRES) 0.1 MG tablet; Take 1 tablet (0.1 mg) by mouth 3 times daily as needed (anxiety).  Dispense: 45 tablet; Refill: 0         Return in about 2 weeks (around 1/9/2025) for Follow up, in [id number].      Patient counseling completed today:  Discussed mechanism of action, potential risks/benefits/side effects of medications and other recommendations above.     Discussed risk of precipitated withdrawal with initiation of buprenorphine in the presence of full opioid agonists.    Reviewed directions for initiation of buprenorphine to reduce risk of precipitated withdrawal and maximize efficacy.    Harm reduction counseling including never use alone, availability of naloxone, risks associated with concurrent use of opioids and  benzodiazepines, alcohol, or other sedatives, safer administration as applicable.  Discussed importance of avoiding isolation, building a network of supportive relationships, avoiding people/places/things associated with past use to reduce risk of relapse; including motivational interviewing regarding psychosocial interventions.   SUBJECTIVE                                                          CC/HPI:  Rambo Scott is a 24 year old male with PMH seizure 7/25/24 after starting bupropion, depression,  anxiety, tobacco use disorder, stimulant use disorder,  and opioid use disorder on buprenorphine who presents to the [facility] for return visit.       Brief History:  Rambo Scott was first seen in Recovery Clinic on 08/01/24. They were referred by [facility] to continue buprenorphine as he began IOP with [facility].  Continued buprenorphine 12mg/day through  at initial visit.   - Brief return to use after initial visit in setting of death of friend's sister  - 8/29/24 buprenorphine increased to 16mg/day  - 1 1/2 week return to use of opioids and other substances 10/2024 while on trip to [facility]  - 10/25/24 buprenorphine increased to 20mg/day.  Started programming with [facility]    Recommendations last visit: 12/6/2024  1. Opioid use disorder, severe, dependence (H) (Primary)  Reporting control of symptoms  Continue buprenorphine 20mg/day  Recommended Miralax daily prn OIC  Continue programming with [facility]  - Drugs of Abuse Screen Urine (POC CUPS) POCT  - Buprenorphine HCl-Naloxone HCl (SUBOXONE) 12-3 MG FILM per film; Place 1 Film under the tongue daily.  Dispense: 15 Film; Refill: 0  - buprenorphine HCl-naloxone HCl (SUBOXONE) 8-2 MG per film; Place 1 Film under the tongue daily.  Dispense: 15 Film; Refill: 0     2. Encounter for monitoring opioid maintenance therapy  - Drugs of Abuse Screen Urine (POC CUPS) POCT     3. Cannabis use disorder  Start NAC as noted.  Discussed retail sources for NAC if unable to  "fill at pharmacy.   Continue programming at [facility]  - acetylcysteine (N-ACETYL CYSTEINE) 600 MG CAPS capsule; Take 2 capsules (1,200 mg) by mouth 2 times daily.  Dispense: 120 capsule; Refill: 11     4. Tobacco use disorder  Not interested in quitting at this time     12/26/24 HPI:    [place] trip went well, still sober. Went there with his SO to visit her parents.   Has 2 months sober since his relapse in [facility].   Is still in IOP at [facility].   Is planning on starting classes at [facility]  Is taking 20 mg of suboxone, cravings are controlled.       Substance Use History :  Opioids:   Age at first use: [age] started using fentanyl. Was tired of being sick so he was began using Oxycodone 200 mg daily.   Current use: substance: Oxycodone; quantity 200 mg route: insufflation ; timing of last use: 10/24/24                IV drug use: No   History of overdose: No  Previous residential or outpatient treatments for addiction : Yes:  [facility]  Previous medication treatments for addiction: Yes: Suboxone  Longest period of sobriety: 8/14/24 to present  Medical complications related to substance use: none known  Hepatitis C: Negative on 7/17/23, reports more recent screening in July 2024   HIV: Non-reactive on 7/17/23, reports more recent screening in July 2024      Other Addiction History:  Stimulants   H/o daily cocaine use for 5 months prior to treatment; last use 10/2024  Sedatives/hypnotics/anxiolytics:   Occasional use  of Benzos, last use 10/2024  Alcohol:   Denies  Nicotine:   Vaping and smoking  Cannabis:   H/o of daily use, \"was a problem until going to treatment.\"  Hallucinogens/Dissociatives:   Has tried a few times  Eating disorder:  Denies  Gambling:              Once     PAST PSYCHIATRIC HISTORY:  Diagnoses- Depression and anxiety  Suicide Attempts: No   Hospitalizations: No       11/25/2024     2:00 PM 12/6/2024     3:00 PM 12/26/2024     2:00 PM   PHQ   PHQ-9 Total Score 12 9 11   Q9: Thoughts of better off " dead/self-harm past 2 weeks Not at all Not at all Not at all     Social History  Housing status: with his girlfriend  Education: HS graduate  Employment status: Unemployed, not seeking work  Relationship status: Partnered  Children: no children  Legal: denies      Labs discussed with patient?  Yes      [place] Prescription Drug Monitoring Program Reviewed:  Yes  12/06/2024 12/06/2024 1 Buprenorphine-Nalox 12-3mg Flm 15.00 15 Li Vol [id number] Paddy ([id number]) 0/0 12.00 mg Comm Ins [place]   12/06/2024 12/06/2024 1 Buprenorphine-Nalox 8-2mg Film 15.00 15 Li Vol             Medications:  Current Outpatient Medications   Medication Sig Dispense Refill    Buprenorphine HCl-Naloxone HCl (SUBOXONE) 12-3 MG FILM per film Place 1 Film under the tongue daily. 15 Film 0    buprenorphine HCl-naloxone HCl (SUBOXONE) 8-2 MG per film Place 1 Film under the tongue daily. 15 Film 0    cloNIDine (CATAPRES) 0.1 MG tablet Take 1 tablet (0.1 mg) by mouth 3 times daily as needed (anxiety). 45 tablet 0    hydrOXYzine HCl (ATARAX) 25 MG tablet Take 1 tablet (25 mg) by mouth 3 times daily as needed for anxiety. 45 tablet 0    acetylcysteine (N-ACETYL CYSTEINE) 600 MG CAPS capsule Take 2 capsules (1,200 mg) by mouth 2 times daily. 120 capsule 11    docusate sodium (COLACE) 100 MG capsule Take 1 capsule (100 mg) by mouth 2 times daily. 60 capsule 1    FLUoxetine (PROZAC) 10 MG capsule Take 1 capsule (10 mg) by mouth daily. Take with 20 mg for a total of 30 mg daily 90 capsule 3    FLUoxetine (PROZAC) 20 MG capsule Take 1 capsule (20 mg) by mouth daily. Take with 10 mg for a total of 30 mg daily 90 capsule 3    naloxone (NARCAN) 4 MG/0.1ML nasal spray Spray 1 spray (4 mg) into one nostril alternating nostrils as needed for opioid reversal. every 2-3 minutes until assistance arrives 0.2 mL 11    polyethylene glycol (MIRALAX) 17 GM/Dose powder Take 17 g (1 Capful) by mouth daily. 578 g 0     No current facility-administered medications for this visit.        No Known Allergies    PMH, PSH, FamHx reviewed      OBJECTIVE                                                      /72   Pulse 64     Physical Exam  Cardiovascular:      Rate and Rhythm: Normal rate.   Pulmonary:      Effort: Pulmonary effort is normal. No respiratory distress.   Neurological:      General: No focal deficit present.      Mental Status: He is alert and oriented to person, place, and time.   Psychiatric:         Attention and Perception: Attention normal.         Mood and Affect: Mood normal.         Speech: Speech normal.         Behavior: Behavior is cooperative.         Thought Content: Thought content normal.         Judgment: Judgment normal.         Labs:    UDS:    Lab Results   Component Value Date    BUP Screen Positive (A) 12/26/2024    BZO Negative 12/26/2024    BAR Negative 12/26/2024    DENNIS Negative 12/26/2024    MAMP Negative 12/26/2024    AMP Negative 12/26/2024    MDMA Negative 12/26/2024    MTD Negative 12/26/2024    [id number] Negative 12/26/2024    OXY Negative 12/26/2024    PCP Negative 12/26/2024    THC Screen Positive (A) 12/26/2024    TEMP Invalid (A) 12/26/2024    SGPOCT 1.015 12/26/2024     *POC urine drug screen does not screen for Fentanyl      Recent Results (from the past 240 hours)   Drugs of Abuse Screen Urine (POC CUPS) POCT    Collection Time: 12/26/24  2:51 PM   Result Value Ref Range    POCT Kit Lot Number [id number]     POCT Kit Expiration Date 07/14/2026     Temperature Urine POCT Invalid (A) 90 F, 92 F, 94 F, 96 F, 98 F, 100 F    Specific [place] POCT 1.015 1.005, 1.015, 1.025    pH Qual Urine POCT 5 pH 4 pH, 5 pH, 7 pH, 9 pH    Creatinine Qual Urine POCT 20 mg/dL 20 mg/dL, 50 mg/dL, 100 mg/dL, 200 mg/dL    Internal QC Qual Urine POCT Valid Valid    Amphetamine Qual Urine POCT Negative Negative    Barbiturate Qual Urine POCT Negative Negative    Buprenorphine Qual Urine POCT Screen Positive (A) Negative    Benzodiazepine Qual Urine POCT Negative Negative     Cocaine Qual Urine POCT Negative Negative    Methamphetamine Qual Urine POCT Negative Negative    MDMA Qual Urine POCT Negative Negative    Methadone Qual Urine POCT Negative Negative    Opiate Qual Urine POCT Negative Negative    Oxycodone Qual Urine POCT Negative Negative    Phencyclidine Qual Urine POCT Negative Negative    THC Qual Urine POCT Screen Positive (A) Negative              Continued Complex Management  The longitudinal plan of care for Opioid Use Disorder (OUD) was addressed during this visit. Due to the added complexity in care, I will continue to support Thor in the subsequent management and with ongoing continuity of care.    [place], [place] [id number] [place] [id number]  [phone]

## 2024-12-26 NOTE — NURSING NOTE
[facility]      Rooming information:    Approximate last use of full opioid agonist: 10/24/24  Taking buprenorphine? Yes:   How much per day? 20mg  Number of buprenorphine films/tablets remaining currently: 0  Side effects related to buprenorphine (constipation, dry mouth, sedation?) No   Narcan currently available: Yes  Other recent substance use:    Denies  NICOTINE-Yes: vape & cigs  If using nicotine, ready to quit? No    Point of care urine drug screen positive for:  Lab Results   Component Value Date    BUP Screen Positive (A) 12/26/2024    BZO Negative 12/26/2024    BAR Negative 12/26/2024    DENNIS Negative 12/26/2024    MAMP Negative 12/26/2024    AMP Negative 12/26/2024    MDMA Negative 12/26/2024    MTD Negative 12/26/2024    [id number] Negative 12/26/2024    OXY Negative 12/26/2024    PCP Negative 12/26/2024    THC Screen Positive (A) 12/26/2024    TEMP Invalid (A) 12/26/2024    SGPOCT 1.015 12/26/2024       *POC urine drug screen does not screen for Fentanyl        Depression Response    Patient completed the PHQ-9 assessment for depression and scored >9? Yes  Question 9 on the PHQ-9 was positive for suicidality? No  Does patient have current mental health provider? Yes  C-SSRS screener risk level.       Is this a virtual visit? No    I personally notified the following: MOSES          12/26/2024     2:00 PM   PHQ Assesment Total Score(s)   PHQ-9 Score 11         Housing needs: stable    Insurance: active    Current legal issues: none    Contact information up to date? yes    3rd Party Involvement  no today (please obtain ELI if pt would like to include)    Brianna Helm MA  December 26, 2024  2:39 PM

## 2025-01-09 ENCOUNTER — OFFICE VISIT (OUTPATIENT)
Dept: BEHAVIORAL HEALTH | Facility: CLINIC | Age: 25
End: 2025-01-09
Payer: COMMERCIAL

## 2025-01-09 VITALS — DIASTOLIC BLOOD PRESSURE: 70 MMHG | SYSTOLIC BLOOD PRESSURE: 112 MMHG | HEART RATE: 80 BPM

## 2025-01-09 DIAGNOSIS — Z79.891 ENCOUNTER FOR MONITORING OPIOID MAINTENANCE THERAPY: ICD-10-CM

## 2025-01-09 DIAGNOSIS — F11.20 OPIOID USE DISORDER, SEVERE, DEPENDENCE (H): Primary | ICD-10-CM

## 2025-01-09 DIAGNOSIS — F41.8 DEPRESSION WITH ANXIETY: ICD-10-CM

## 2025-01-09 DIAGNOSIS — F17.200 NICOTINE USE DISORDER: ICD-10-CM

## 2025-01-09 DIAGNOSIS — Z51.81 ENCOUNTER FOR MONITORING OPIOID MAINTENANCE THERAPY: ICD-10-CM

## 2025-01-09 LAB
AMPHETAMINE QUAL URINE POCT: NEGATIVE
BARBITURATE QUAL URINE POCT: NEGATIVE
BENZODIAZEPINE QUAL URINE POCT: NEGATIVE
BUPRENORPHINE QUAL URINE POCT: ABNORMAL
COCAINE QUAL URINE POCT: NEGATIVE
CREAT UR-MCNC: 39 MG/DL
CREATININE QUAL URINE POCT: ABNORMAL
INTERNAL QC QUAL URINE POCT: ABNORMAL
MDMA QUAL URINE POCT: NEGATIVE
METHADONE QUAL URINE POCT: NEGATIVE
METHAMPHETAMINE QUAL URINE POCT: NEGATIVE
OPIATE QUAL URINE POCT: NEGATIVE
OXYCODONE QUAL URINE POCT: NEGATIVE
PH QUAL URINE POCT: ABNORMAL
PHENCYCLIDINE QUAL URINE POCT: NEGATIVE
POCT KIT EXPIRATION DATE: ABNORMAL
POCT KIT LOT NUMBER: ABNORMAL
SPECIFIC GRAVITY POCT: 1.02
TEMPERATURE URINE POCT: ABNORMAL
THC QUAL URINE POCT: ABNORMAL

## 2025-01-09 RX ORDER — BUPRENORPHINE AND NALOXONE 12; 3 MG/1; MG/1
1 FILM, SOLUBLE BUCCAL; SUBLINGUAL DAILY
Qty: 15 FILM | Refills: 0 | Status: SHIPPED | OUTPATIENT
Start: 2025-01-09

## 2025-01-09 RX ORDER — BUPRENORPHINE AND NALOXONE 8; 2 MG/1; MG/1
1 FILM, SOLUBLE BUCCAL; SUBLINGUAL DAILY
Qty: 15 FILM | Refills: 0 | Status: SHIPPED | OUTPATIENT
Start: 2025-01-09

## 2025-01-09 RX ORDER — HYDROXYZINE HYDROCHLORIDE 25 MG/1
25 TABLET, FILM COATED ORAL 3 TIMES DAILY PRN
Qty: 45 TABLET | Refills: 0 | Status: SHIPPED | OUTPATIENT
Start: 2025-01-09

## 2025-01-09 RX ORDER — FLUOXETINE 40 MG/1
40 CAPSULE ORAL DAILY
Qty: 90 CAPSULE | Refills: 3 | Status: SHIPPED | OUTPATIENT
Start: 2025-01-09

## 2025-01-09 RX ORDER — CLONIDINE HYDROCHLORIDE 0.1 MG/1
0.1 TABLET ORAL 3 TIMES DAILY PRN
Qty: 45 TABLET | Refills: 0 | Status: SHIPPED | OUTPATIENT
Start: 2025-01-09

## 2025-01-09 ASSESSMENT — PATIENT HEALTH QUESTIONNAIRE - PHQ9: SUM OF ALL RESPONSES TO PHQ QUESTIONS 1-9: 8

## 2025-01-09 NOTE — PROGRESS NOTES
[facility] Follow Up    ASSESSMENT/PLAN                                                    1. Opioid use disorder, severe, dependence (H) (Primary)  Reporting control of symptoms  Continue buprenorphine 20mg/day  Pt was given contact information for Nova Ellison [phone] to discuss next steps for him to engage in outpatient JILL treatment with [facility] activities  Pt confirmed he has naloxone.   - Drug Confirmation Panel Urine with Creat - lab collect; Future  - Drug Confirmation Panel Urine with Creat - lab collect  - Drugs of Abuse Screen Urine (POC CUPS) POCT  - Buprenorphine HCl-Naloxone HCl (SUBOXONE) 12-3 MG FILM per film; Place 1 Film under the tongue daily.  Dispense: 15 Film; Refill: 0  - buprenorphine HCl-naloxone HCl (SUBOXONE) 8-2 MG per film; Place 1 Film under the tongue daily.  Dispense: 15 Film; Refill: 0    2. Encounter for monitoring opioid maintenance therapy  - Drug Confirmation Panel Urine with Creat - lab collect; Future  - Drug Confirmation Panel Urine with Creat - lab collect  - Drugs of Abuse Screen Urine (POC CUPS) POCT    3. Nicotine use disorder  Not interested in quitting at this time    4. Depression with anxiety  Persistent symptoms  Increase fluoxetine to 40mg/day  Refilled hydroxyzine and clonidine unchanged  - FLUoxetine (PROZAC) 40 MG capsule; Take 1 capsule (40 mg) by mouth daily.  Dispense: 90 capsule; Refill: 3  - hydrOXYzine HCl (ATARAX) 25 MG tablet; Take 1 tablet (25 mg) by mouth 3 times daily as needed for anxiety.  Dispense: 45 tablet; Refill: 0  - cloNIDine (CATAPRES) 0.1 MG tablet; Take 1 tablet (0.1 mg) by mouth 3 times daily as needed (anxiety).  Dispense: 45 tablet; Refill: 0    Return in about 2 weeks (around 1/23/2025).      Patient counseling completed today:  Discussed mechanism of action, potential risks/benefits/side effects of medications and other recommendations above.     Harm reduction counseling including  never use alone, availability of naloxone, risks associated with concurrent use of opioids and benzodiazepines, alcohol, or other sedatives, safer administration as applicable.  Discussed importance of avoiding isolation, building a network of supportive relationships, avoiding people/places/things associated with past use to reduce risk of relapse; including motivational interviewing regarding psychosocial interventions.   SUBJECTIVE                                                          CC/HPI:  Rambo Scott is a 24 year old male with PMH seizure 7/25/24 after starting bupropion, depression,  anxiety, tobacco use disorder, stimulant use disorder,  and opioid use disorder on buprenorphine who presents to the Recovery Clinic for return visit.       Brief History:  Rambo Scott was first seen in Recovery Clinic on 08/01/24. They were referred by [facility] to continue buprenorphine as he began IOP with [facility].  Continued buprenorphine 12mg/day through  at initial visit.   - Brief return to use after initial visit in setting of death of friend's sister  - 8/29/24 buprenorphine increased to 16mg/day  - 1 1/2 week return to use of opioids and other substances 10/2024 while on trip to [facility]  - 10/25/24 buprenorphine increased to 20mg/day.  Started programming with [facility]  - 1/2025 pt had left [facility], interested in OP with [facility]    Recommendations last visit: 12/26/24  1. Opioid use disorder, severe, dependence (H)  ~ 2 months from a brief return to use. Is back in IOP at [facility] and is taking 20 mg of suboxone. Reporting symptoms are controlled.   Continue suboxone unchanged  Continue programming at [facility] and follow recommendations  Continue weekly meeting attendance  Consider updating hepatic profile.   - Comprehensive metabolic panel (BMP + Alb, Alk Phos, ALT, AST, Total. Bili, TP)  - Drugs of Abuse Screen Urine (POC CUPS) POCT  - Buprenorphine HCl-Naloxone HCl (SUBOXONE) 12-3 MG FILM per film; Place 1  "Film under the tongue daily.  Dispense: 15 Film; Refill: 0  - buprenorphine HCl-naloxone HCl (SUBOXONE) 8-2 MG per film; Place 1 Film under the tongue daily.  Dispense: 15 Film; Refill: 0     2. Encounter for monitoring opioid maintenance therapy  - Drugs of Abuse Screen Urine (POC CUPS) POCT     3. Depression with anxiety  Reporting stable mood, continue clonidine and hydroxyzine  - hydrOXYzine HCl (ATARAX) 25 MG tablet; Take 1 tablet (25 mg) by mouth 3 times daily as needed for anxiety.  Dispense: 45 tablet; Refill: 0  - cloNIDine (CATAPRES) 0.1 MG tablet; Take 1 tablet (0.1 mg) by mouth 3 times daily as needed (anxiety).  Dispense: 45 tablet; Refill: 0                Return in about 2 weeks (around 1/9/2025) for Follow up, in person       1/9/25 HPI:  Today pt states he has continued to take buprenorphine 20mg/day as prescribed.  He describes his cravings as \"lower than they ever have been.\"  Denies significant side effects related to buprenorphine.   [facility] some unpleasant interactions in his group.  He would like to engage in another outpatient program.    He has enrolled at [employer] [facility], starting classes 1/13/25, taking 16 credits.  He is excited and nervous about this.  States he has not had classroom experience being sober since 9th grade.  He describes significant frustration in the past when having to concentrate to learn new things.  He and his mother have discussed retesting for ADHD.   He has been going to mutual support groups daily.  Has a sponsor but states he needs to call him more often.   Overall states he feels unmotivated.  He has been able to appreciate moments of contentment from time to time.        Substance Use History :  Opioids:   Age at first use: [age] started using fentanyl. Was tired of being sick so he was began using Oxycodone 200 mg daily.   Current use: substance: Oxycodone; quantity 200 mg route: insufflation ; timing of last use: 10/24/24        " "        IV drug use: No   History of overdose: No  Previous residential or outpatient treatments for addiction : Yes:  [facility]  Previous medication treatments for addiction: Yes: Suboxone  Longest period of sobriety: 8/14/24 to present  Medical complications related to substance use: none known  Hepatitis C: Negative on 7/17/23, reports more recent screening in July 2024   HIV: Non-reactive on 7/17/23, reports more recent screening in July 2024      Other Addiction History:  Stimulants   H/o daily cocaine use for 5 months prior to treatment; last use 10/2024  Sedatives/hypnotics/anxiolytics:   Occasional use  of Benzos, last use 10/2024  Alcohol:   Denies  Nicotine:   Vaping and smoking  Cannabis:   H/o of daily use, \"was a problem until going to treatment.\"  Hallucinogens/Dissociatives:   Has tried a few times  Eating disorder:  Denies  Gambling:              Once     PAST PSYCHIATRIC HISTORY:  Diagnoses- Depression and anxiety  Suicide Attempts: No   Hospitalizations: No       12/6/2024     3:00 PM 12/26/2024     2:00 PM 1/9/2025     3:00 PM   PHQ   PHQ-9 Total Score 9 11 8   Q9: Thoughts of better off dead/self-harm past 2 weeks Not at all Not at all Not at all     Social History  Housing status: with his girlfriend or his mother  Education: HS graduate  Employment status: Unemployed, not seeking work  Relationship status: Partnered  Children: no children  Legal: navjot        [place] Prescription Drug Monitoring Program Reviewed:  Yes  12/26/2024 12/26/2024 1 Buprenorphine-Nalox 8-2mg Film 15.00 15  Bat [id number] Paddy ([id number]) 0/0 8.00 mg Comm Ins [place]   12/26/2024 12/26/2024 1 Buprenorphine-Nalox 12-3mg Flm 15.00 15  Bat [id number] Paddy ([id number]) 0/0 12.00 mg [place]       Medications:  Current Outpatient Medications   Medication Sig Dispense Refill    acetylcysteine (N-ACETYL CYSTEINE) 600 MG CAPS capsule Take 2 capsules (1,200 mg) by mouth 2 times daily. 120 capsule 11    Buprenorphine HCl-Naloxone HCl " (SUBOXONE) 12-3 MG FILM per film Place 1 Film under the tongue daily. 15 Film 0    buprenorphine HCl-naloxone HCl (SUBOXONE) 8-2 MG per film Place 1 Film under the tongue daily. 15 Film 0    cloNIDine (CATAPRES) 0.1 MG tablet Take 1 tablet (0.1 mg) by mouth 3 times daily as needed (anxiety). 45 tablet 0    docusate sodium (COLACE) 100 MG capsule Take 1 capsule (100 mg) by mouth 2 times daily. 60 capsule 1    FLUoxetine (PROZAC) 10 MG capsule Take 1 capsule (10 mg) by mouth daily. Take with 20 mg for a total of 30 mg daily 90 capsule 3    FLUoxetine (PROZAC) 20 MG capsule Take 1 capsule (20 mg) by mouth daily. Take with 10 mg for a total of 30 mg daily 90 capsule 3    hydrOXYzine HCl (ATARAX) 25 MG tablet Take 1 tablet (25 mg) by mouth 3 times daily as needed for anxiety. 45 tablet 0    naloxone (NARCAN) 4 MG/0.1ML nasal spray Spray 1 spray (4 mg) into one nostril alternating nostrils as needed for opioid reversal. every 2-3 minutes until assistance arrives 0.2 mL 11    polyethylene glycol (MIRALAX) 17 GM/Dose powder Take 17 g (1 Capful) by mouth daily. 578 g 0     No current facility-administered medications for this visit.       No Known Allergies    PMH, PSH, FamHx reviewed      OBJECTIVE                                                      /70   Pulse 80     Physical Exam  Constitutional:       General: He is not in acute distress.  Eyes:      General: No scleral icterus.     Extraocular Movements: Extraocular movements intact.      Conjunctiva/sclera: Conjunctivae normal.   Pulmonary:      Effort: Pulmonary effort is normal.   Neurological:      General: No focal deficit present.      Mental Status: He is alert and oriented to person, place, and time.   Psychiatric:         Attention and Perception: Attention normal.         Mood and Affect: Mood normal. Affect is not inappropriate.         Speech: Speech normal.         Behavior: Behavior is cooperative.         Thought Content: Thought content normal.          Judgment: Judgment normal.         Labs:    UDS:      *POC urine drug screen does not screen for Fentanyl      Recent Results (from the past 240 hours)   Drugs of Abuse Screen Urine (POC CUPS) POCT    Collection Time: 01/09/25  3:40 PM   Result Value Ref Range    POCT Kit Lot Number [id number]     POCT Kit Expiration Date 2436548     Temperature Urine POCT 90 F 90 F, 92 F, 94 F, 96 F, 98 F, 100 F    Specific [place] POCT 1.025 1.005, 1.015, 1.025    pH Qual Urine POCT 5 pH 4 pH, 5 pH, 7 pH, 9 pH    Creatinine Qual Urine POCT 50 mg/dL 20 mg/dL, 50 mg/dL, 100 mg/dL, 200 mg/dL    Internal QC Qual Urine POCT Valid Valid    Amphetamine Qual Urine POCT Negative Negative    Barbiturate Qual Urine POCT Negative Negative    Buprenorphine Qual Urine POCT Screen Positive (A) Negative    Benzodiazepine Qual Urine POCT Negative Negative    Cocaine Qual Urine POCT Negative Negative    Methamphetamine Qual Urine POCT Negative Negative    MDMA Qual Urine POCT Negative Negative    Methadone Qual Urine POCT Negative Negative    Opiate Qual Urine POCT Negative Negative    Oxycodone Qual Urine POCT Negative Negative    Phencyclidine Qual Urine POCT Negative Negative    THC Qual Urine POCT Screen Positive (A) Negative       At least 40min spent on day of visit in review of medical record,  review, obtaining histories, discussing recommendations, counseling, coordination of care, providing support    The longitudinal plan of care for the diagnosis(es)/condition(s) as documented were addressed during this visit. Due to the added complexity in care, I will continue to support Thor in the subsequent management and with ongoing continuity of care.      Deanna Aragon MD  Addiction Medicine  [place] [id number] [address]  [phone]

## 2025-01-09 NOTE — NURSING NOTE
[facility]      Rooming information:    Approximate last use of full opioid agonist: 10/24/24  Taking buprenorphine? Yes: suboxone  How much per day? 20mg  Number of buprenorphine films/tablets remaining currently: 0  Side effects related to buprenorphine (constipation, dry mouth, sedation?) No   Narcan currently available: Yes  Other recent substance use:    Denies  NICOTINE-Yes: vape & cigs  If using nicotine, ready to quit? No    Point of care urine drug screen positive for:  Lab Results   Component Value Date    BUP Screen Positive (A) 01/09/2025    BZO Negative 01/09/2025    BAR Negative 01/09/2025    DENNIS Negative 01/09/2025    MAMP Negative 01/09/2025    AMP Negative 01/09/2025    MDMA Negative 01/09/2025    MTD Negative 01/09/2025    [id number] Negative 01/09/2025    OXY Negative 01/09/2025    PCP Negative 01/09/2025    THC Screen Positive (A) 01/09/2025    TEMP 90 F 01/09/2025    SGPOCT 1.025 01/09/2025       *POC urine drug screen does not screen for Fentanyl        Depression Response    Patient completed the PHQ-9 assessment for depression and scored >9? NO  Question 9 on the PHQ-9 was positive for suicidality? No  Does patient have current mental health provider? Yes  C-SSRS screener risk level.       Is this a virtual visit? No    I personally notified the following: [facility]          1/9/2025     3:00 PM   PHQ Assesment Total Score(s)   PHQ-9 Score 8         Housing needs: stable    Insurance: active    Current legal issues: none    Contact information up to date? yes    3rd Party Involvement  no today (please obtain ELI if pt would like to include)    Brianna Helm MA  January 9, 2025  3:39 PM

## 2025-01-13 LAB
BUPRENORPHINE UR CFM-MCNC: 95 NG/ML
BUPRENORPHINE/CREAT UR: 244 NG/MG {CREAT}
NALOXONE UR CFM-MCNC: 103 NG/ML
NALOXONE: 264 NG/MG {CREAT}
NORBUPRENORPHINE UR CFM-MCNC: 1207 NG/ML
NORBUPRENORPHINE/CREAT UR: 3095 NG/MG {CREAT}

## 2025-01-25 ENCOUNTER — HOSPITAL ENCOUNTER (EMERGENCY)
Facility: CLINIC | Age: 25
Discharge: HOME | End: 2025-01-25
Attending: EMERGENCY MEDICINE | Admitting: EMERGENCY MEDICINE
Payer: COMMERCIAL

## 2025-01-25 VITALS
DIASTOLIC BLOOD PRESSURE: 66 MMHG | RESPIRATION RATE: 16 BRPM | SYSTOLIC BLOOD PRESSURE: 124 MMHG | TEMPERATURE: 98.2 F | HEART RATE: 73 BPM | OXYGEN SATURATION: 99 %

## 2025-01-25 DIAGNOSIS — F11.90 OPIOID USE DISORDER: ICD-10-CM

## 2025-01-25 PROCEDURE — 99283 EMERGENCY DEPT VISIT LOW MDM: CPT | Performed by: EMERGENCY MEDICINE

## 2025-01-25 RX ORDER — BUPRENORPHINE AND NALOXONE 8; 2 MG/1; MG/1
1 FILM, SOLUBLE BUCCAL; SUBLINGUAL DAILY
Qty: 6 FILM | Refills: 0 | Status: SHIPPED | OUTPATIENT
Start: 2025-01-25 | End: 2025-01-27

## 2025-01-25 ASSESSMENT — COLUMBIA-SUICIDE SEVERITY RATING SCALE - C-SSRS
2. HAVE YOU ACTUALLY HAD ANY THOUGHTS OF KILLING YOURSELF IN THE PAST MONTH?: NO
6. HAVE YOU EVER DONE ANYTHING, STARTED TO DO ANYTHING, OR PREPARED TO DO ANYTHING TO END YOUR LIFE?: NO
1. IN THE PAST MONTH, HAVE YOU WISHED YOU WERE DEAD OR WISHED YOU COULD GO TO SLEEP AND NOT WAKE UP?: NO

## 2025-01-25 ASSESSMENT — ACTIVITIES OF DAILY LIVING (ADL): ADLS_ACUITY_SCORE: 41

## 2025-01-25 NOTE — ED TRIAGE NOTES
Patient takes suboxone BID, 8 mg in am and 12 mg in the evening.      Triage Assessment (Adult)       Row Name 01/25/25 1443          Triage Assessment    Airway WDL WDL        Respiratory WDL    Respiratory WDL WDL        Skin Circulation/Temperature WDL    Skin Circulation/Temperature WDL WDL        Cardiac WDL    Cardiac WDL WDL        Peripheral/Neurovascular WDL    Peripheral Neurovascular WDL WDL        Cognitive/Neuro/Behavioral WDL    Cognitive/Neuro/Behavioral WDL WDL

## 2025-01-25 NOTE — DISCHARGE INSTRUCTIONS
Up with recovery clinic on Monday as planned.  If there are any issues filling a prescription at the pharmacy today, please return to the emergency department as we may be able to dispense the medications to you directly from our X[facility]a machine.

## 2025-01-25 NOTE — ED PROVIDER NOTES
ED Provider Note  [facility]      History     Chief Complaint   Patient presents with    Medication Refill     Pt states he is out of suboxone, Recovery clinic is closed. Pt needs refil     HPI  Rambo Scott is a 24 year old male with a history of severe opioid use disorder on Suboxone, seizure with bupropion, stimulant use disorder, tobacco use disorder, anxiety, and depression who presents to the emergency department for Suboxone refill.  Reports his last dose was this morning and that he has a plan to follow-up with recovery clinic on Monday.  He reports his cravings have been reasonably well controlled at current dose.    Past Medical History  History reviewed. No pertinent past medical history.  No past surgical history on file.  buprenorphine HCl-naloxone HCl (SUBOXONE) 8-2 MG per film  buprenorphine HCl-naloxone HCl (SUBOXONE) 8-2 MG per film  cloNIDine (CATAPRES) 0.1 MG tablet  FLUoxetine (PROZAC) 40 MG capsule  hydrOXYzine HCl (ATARAX) 25 MG tablet  acetylcysteine (N-ACETYL CYSTEINE) 600 MG CAPS capsule  Buprenorphine HCl-Naloxone HCl (SUBOXONE) 12-3 MG FILM per film  docusate sodium (COLACE) 100 MG capsule  naloxone (NARCAN) 4 MG/0.1ML nasal spray  polyethylene glycol (MIRALAX) 17 GM/Dose powder      No Known Allergies  Family History  No family history on file.  Social History   Social History     Tobacco Use    Smoking status: Every Day     Types: Cigarettes, Vaping Device    Smokeless tobacco: Never   Substance Use Topics    Alcohol use: Never    Drug use: Not Currently      Past medical history, past surgical history, medications, allergies, family history, and social history were reviewed with the patient. No additional pertinent items.   A medically appropriate review of systems was performed with pertinent positives and negatives noted in the HPI, and all other systems negative.    Physical Exam      Physical Exam  Vitals reviewed.   Constitutional:       General: He  is not in acute distress.     Appearance: He is well-developed. He is not diaphoretic.   HENT:      Head: Normocephalic and atraumatic.      Nose: Nose normal. No congestion.      Mouth/Throat:      Mouth: Mucous membranes are moist.      Pharynx: Oropharynx is clear.   Eyes:      General: No scleral icterus.     Extraocular Movements: Extraocular movements intact.      Conjunctiva/sclera: Conjunctivae normal.      Pupils: Pupils are equal, round, and reactive to light.   Cardiovascular:      Rate and Rhythm: Normal rate.   Pulmonary:      Effort: Pulmonary effort is normal.   Abdominal:      General: Abdomen is flat.   Musculoskeletal:         General: No tenderness or deformity. Normal range of motion.      Cervical back: Normal range of motion and neck supple.   Lymphadenopathy:      Cervical: No cervical adenopathy.   Skin:     General: Skin is warm and dry.      Capillary Refill: Capillary refill takes less than 2 seconds.      Findings: No rash.   Neurological:      General: No focal deficit present.      Mental Status: He is alert and oriented to person, place, and time.      Cranial Nerves: No cranial nerve deficit.      Sensory: No sensory deficit.      Coordination: Coordination normal.       ED Course, Procedures, & Data      Procedures            No results found for any visits on 01/25/25.  Medications - No data to display  Labs Ordered and Resulted from Time of ED Arrival to Time of ED Departure - No data to display  No orders to display            Assessment & Plan      24-year-old male with history of opioid use disorder, presents to the emergency department requesting refill of his Suboxone.  Refill prescribed and patient advised to follow-up with recovery clinic on Monday as planned.    I have reviewed the nursing notes. I have reviewed the findings, diagnosis, plan and need for follow up with the patient.    New Prescriptions    No medications on file       Final diagnoses:   Opioid use disorder        Johanne Garrett MD  [facility] ED EMERGENCY DEPARTMENT  1/25/2025     Johanne Garrett MD  01/25/25 [id number]

## 2025-01-27 ENCOUNTER — OFFICE VISIT (OUTPATIENT)
Dept: BEHAVIORAL HEALTH | Facility: CLINIC | Age: 25
End: 2025-01-27
Payer: COMMERCIAL

## 2025-01-27 VITALS — DIASTOLIC BLOOD PRESSURE: 72 MMHG | SYSTOLIC BLOOD PRESSURE: 129 MMHG | HEART RATE: 69 BPM | OXYGEN SATURATION: 96 %

## 2025-01-27 DIAGNOSIS — F11.20 OPIOID USE DISORDER, SEVERE, DEPENDENCE (H): Primary | ICD-10-CM

## 2025-01-27 DIAGNOSIS — Z79.891 ENCOUNTER FOR MONITORING OPIOID MAINTENANCE THERAPY: ICD-10-CM

## 2025-01-27 DIAGNOSIS — Z51.81 ENCOUNTER FOR MONITORING OPIOID MAINTENANCE THERAPY: ICD-10-CM

## 2025-01-27 DIAGNOSIS — F17.200 NICOTINE USE DISORDER: ICD-10-CM

## 2025-01-27 PROCEDURE — 80305 DRUG TEST PRSMV DIR OPT OBS: CPT | Performed by: FAMILY MEDICINE

## 2025-01-27 PROCEDURE — 99214 OFFICE O/P EST MOD 30 MIN: CPT | Performed by: FAMILY MEDICINE

## 2025-01-27 PROCEDURE — G2211 COMPLEX E/M VISIT ADD ON: HCPCS | Performed by: FAMILY MEDICINE

## 2025-01-27 RX ORDER — BUPRENORPHINE AND NALOXONE 12; 3 MG/1; MG/1
1 FILM, SOLUBLE BUCCAL; SUBLINGUAL DAILY
Qty: 15 FILM | Refills: 0 | Status: SHIPPED | OUTPATIENT
Start: 2025-01-27

## 2025-01-27 RX ORDER — BUPRENORPHINE AND NALOXONE 8; 2 MG/1; MG/1
1 FILM, SOLUBLE BUCCAL; SUBLINGUAL DAILY
Qty: 15 FILM | Refills: 0 | Status: SHIPPED | OUTPATIENT
Start: 2025-01-27

## 2025-01-27 ASSESSMENT — PATIENT HEALTH QUESTIONNAIRE - PHQ9: SUM OF ALL RESPONSES TO PHQ QUESTIONS 1-9: 11

## 2025-01-27 NOTE — Clinical Note
JULIA alejo tomorrow 8am with you Ed, he is interested in outpatient programming (specifically evening program).  Thanks!

## 2025-01-27 NOTE — PATIENT INSTRUCTIONS
JILL evaluation is scheduled for tomorrow @ 8am with Danny Adkins.  Entrance is next to [facility] ([id number]).      Completing the JILL evaluation is the first step to starting outpatient programming.  Let your  know you are interested in program at [place], specifically the evening program.    Follow-up here in 2 weeks, sooner if needed.  Call with questions.

## 2025-01-27 NOTE — NURSING NOTE
[facility]      Rooming information:    Approximate last use of full opioid agonist: 10/2024  Taking buprenorphine? Yes: suboxone  How much per day? 20mg per day  Number of buprenorphine films/tablets remaining currently: 0  Side effects related to buprenorphine (constipation, dry mouth, sedation?) No   Narcan currently available: Yes  Other recent substance use:    Denies  NICOTINE-Yes: vape/cigs  If using nicotine, ready to quit? No    Point of care urine drug screen positive for:  Lab Results   Component Value Date    BUP Screen Positive (A) 01/27/2025    BZO Negative 01/27/2025    BAR Negative 01/27/2025    DENNIS Negative 01/27/2025    MAMP Negative 01/27/2025    AMP Negative 01/27/2025    MDMA Negative 01/27/2025    MTD Negative 01/27/2025    FQH085 Negative 01/27/2025    OXY Negative 01/27/2025    PCP Negative 01/27/2025    THC Negative 01/27/2025    TEMP 94 F 01/27/2025    SGPOCT 1.015 01/27/2025       *POC urine drug screen does not screen for Fentanyl      Depression Response    Patient completed the PHQ-9 assessment for depression and scored >9? No  Question 9 on the PHQ-9 was positive for suicidality? No  Does patient have current mental health provider? No  C-SSRS screener risk level.       Is this a virtual visit? No    I personally notified the following: visit provider          1/27/2025     1:00 PM   PHQ Assesment Total Score(s)   PHQ-9 Score 11         Housing needs: stable     Insurance: active     Current legal issues: none     Contact information up to date? yes     3rd Party Involvement  no today (please obtain ELI if pt would like to include)    Berenice Diallo MA  January 27, 2025  1:20 PM

## 2025-01-27 NOTE — PROGRESS NOTES
[facility] Follow Up    ASSESSMENT/PLAN                                                      1. Opioid use disorder, severe, dependence (H) (Primary)  2. Encounter for monitoring opioid maintenance therapy  Needs improvement.  He remains abstinent from opioid use and stable on Suboxone without side effects, however he continues to have ongoing cravings.  He reflects a lot on his recovery and what he has learned and what he hopes to learn.  He is motivated to resume outpatient treatment but it will need to fit into his class schedule.  We scheduled an JILL evaluation for tomorrow (1/28) @ 8am.  Good discussion regarding Sublocade - he noted that everyone seems to suggest this when he comes to clinic and he wanted to be sure there was no financial incentive for us to encourage this.  I reassured him (at his request, a pinky promise) that there is zero financial gain for any provider here to recommend Sublocade but collectively we find that most patients love it and it provides additional stability to their recovery.  We discussed the benefits, especially the fact that it eliminates the question of stopping buprenorphine to use.  He was appreciative and receptive to this information and will continue to consider.  For now we will continue Suboxone 20mg TDD as ordered.  Confirms access to Narcan.    - Drugs of Abuse Screen Urine (POC CUPS) POCT  - Adult Mental Health [facility] Referral; Future  - buprenorphine HCl-naloxone HCl (SUBOXONE) 8-2 MG per film; Place 1 Film under the tongue daily.  Dispense: 15 Film; Refill: 0  - Buprenorphine HCl-Naloxone HCl (SUBOXONE) 12-3 MG FILM per film; Place 1 Film under the tongue daily.  Dispense: 15 Film; Refill: 0    3. Nicotine use disorder  Not ready to quit.  Continue to reassess.           Return in about 2 weeks (around 2/10/2025) for Follow up, in person.      Patient counseling completed today:  Discussed mechanism of action, potential  risks/benefits/side effects of medications and other recommendations above.     Discussed risk of precipitated withdrawal with initiation of buprenorphine in the presence of full opioid agonists.    Reviewed directions for initiation of buprenorphine to reduce risk of precipitated withdrawal and maximize efficacy.    Harm reduction counseling including never use alone, availability of naloxone, risks associated with concurrent use of opioids and benzodiazepines, alcohol, or other sedatives, safer administration as applicable.  Discussed importance of avoiding isolation, building a network of supportive relationships, avoiding people/places/things associated with past use to reduce risk of relapse; including motivational interviewing regarding psychosocial interventions.   SUBJECTIVE                                                          CC/HPI:  Rambo Scott is a 24 year old male with PMH seizure 7/25/24 after starting bupropion, depression,  anxiety, tobacco use disorder, stimulant use disorder,  and opioid use disorder on buprenorphine who presents to the [facility] for return visit.       Brief History:  Rambo Scott was first seen in Recovery Clinic on 08/01/24. They were referred by [facility] to continue buprenorphine as he began IOP with [facility].  Continued buprenorphine 12mg/day through  at initial visit.   - Brief return to use after initial visit in setting of death of friend's sister  - 8/29/24 buprenorphine increased to 16mg/day  - 1 1/2 week return to use of opioids and other substances 10/2024 while on trip to [facility]  - 10/25/24 buprenorphine increased to 20mg/day.  Started programming with [facility]  - 1/2025 pt had left [facility], interested in OP with [facility]    Recommendations last visit: 1/9/25  1. Opioid use disorder, severe, dependence (H) (Primary)  Reporting control of symptoms  Continue buprenorphine 20mg/day  Pt was given contact information for [facility] [phone] to  "discuss next steps for him to engage in outpatient J[facility] treatment with [facility]  Continue recovery activities  Pt confirmed he has naloxone.   - Drug Confirmation Panel Urine with Creat - lab collect; Future  - Drug Confirmation Panel Urine with Creat - lab collect  - Drugs of Abuse Screen Urine (POC CUPS) POCT  - Buprenorphine HCl-Naloxone HCl (SUBOXONE) 12-3 MG FILM per film; Place 1 Film under the tongue daily.  Dispense: 15 Film; Refill: 0  - buprenorphine HCl-naloxone HCl (SUBOXONE) 8-2 MG per film; Place 1 Film under the tongue daily.  Dispense: 15 Film; Refill: 0     2. Encounter for monitoring opioid maintenance therapy  - Drug Confirmation Panel Urine with Creat - lab collect; Future  - Drug Confirmation Panel Urine with Creat - lab collect  - Drugs of Abuse Screen Urine (POC CUPS) POCT     3. Nicotine use disorder  Not interested in quitting at this time     4. Depression with anxiety  Persistent symptoms  Increase fluoxetine to 40mg/day  Refilled hydroxyzine and clonidine unchanged  - FLUoxetine (PROZAC) 40 MG capsule; Take 1 capsule (40 mg) by mouth daily.  Dispense: 90 capsule; Refill: 3  - hydrOXYzine HCl (ATARAX) 25 MG tablet; Take 1 tablet (25 mg) by mouth 3 times daily as needed for anxiety.  Dispense: 45 tablet; Refill: 0  - cloNIDine (CATAPRES) 0.1 MG tablet; Take 1 tablet (0.1 mg) by mouth 3 times daily as needed (anxiety).  Dispense: 45 tablet; Refill: 0    01/27/25 HPI:  Started classes, school is triggering environment  Examining his social interactions/habits, figuring out how to be around people sober  \"Delusions\", \"I lie to myself\" - reflecting on feelings of self importance  Fear of committing to treatment program but also very willing to continue treatment  \"I want to use every day\" - distraction, reorientation helpful tools  Feels that Suboxone 20mg TDD is helpful, does not feel that increasing dose necessarily provides additional benefit  Ran out over the weekend - almost \"relieved\" that " "[facility] was closed becauase he could use but then security recommended he try the ER and ER doc was so helpful in providing him a new script that he did not use  Considering Sublocade with his mom - \"needle is a line I would never cross\" - he fears the receiving an injection would increase risk of him using IV if he returned to use in the future  Dreams of IV heroin use, never used IV in past    Substance Use History :  Opioids:   Age at first use: [age] started using fentanyl. Was tired of being sick so he was began using Oxycodone 200 mg daily.   Current use: substance: Oxycodone; quantity 200 mg route: insufflation ; timing of last use: 10/24/24                IV drug use: No   History of overdose: No  Previous residential or outpatient treatments for addiction : Yes:  [facility]  Previous medication treatments for addiction: Yes: Suboxone  Longest period of sobriety: 8/14/24 to present  Medical complications related to substance use: none known  Hepatitis C: Negative on 7/17/23, reports more recent screening in July 2024   HIV: Non-reactive on 7/17/23, reports more recent screening in July 2024      Other Addiction History:  Stimulants   H/o daily cocaine use for 5 months prior to treatment; last use 10/2024  Sedatives/hypnotics/anxiolytics:   Occasional use  of Benzos, last use 10/2024  Alcohol:   Denies  Nicotine:   Vaping and smoking  Cannabis:   H/o of daily use, \"was a problem until going to treatment.\"  Hallucinogens/Dissociatives:   Has tried a few times  Eating disorder:  Denies  Gambling:              Once     PAST PSYCHIATRIC HISTORY:  Diagnoses- Depression and anxiety  Suicide Attempts: No   Hospitalizations: No         12/26/2024     2:00 PM 1/9/2025     3:00 PM 1/27/2025     1:00 PM   PHQ   PHQ-9 Total Score 11 8 11   Q9: Thoughts of better off dead/self-harm past 2 weeks Not at all Not at all Not at all       Social History  Housing status: with his girlfriend or his mother  Education: HS " graduate  Employment status: Unemployed, not seeking work  Relationship status: Partnered  Children: no children  Legal: denies    Labs discussed with patient?  Yes      [place] Prescription Drug Monitoring Program Reviewed:  Yes    Medications:  Current Outpatient Medications   Medication Sig Dispense Refill    acetylcysteine (N-ACETYL CYSTEINE) 600 MG CAPS capsule Take 2 capsules (1,200 mg) by mouth 2 times daily. 120 capsule 11    Buprenorphine HCl-Naloxone HCl (SUBOXONE) 12-3 MG FILM per film Place 1 Film under the tongue daily. 15 Film 0    buprenorphine HCl-naloxone HCl (SUBOXONE) 8-2 MG per film Place 1 Film under the tongue daily. 15 Film 0    cloNIDine (CATAPRES) 0.1 MG tablet Take 1 tablet (0.1 mg) by mouth 3 times daily as needed (anxiety). 45 tablet 0    docusate sodium (COLACE) 100 MG capsule Take 1 capsule (100 mg) by mouth 2 times daily. 60 capsule 1    FLUoxetine (PROZAC) 40 MG capsule Take 1 capsule (40 mg) by mouth daily. 90 capsule 3    hydrOXYzine HCl (ATARAX) 25 MG tablet Take 1 tablet (25 mg) by mouth 3 times daily as needed for anxiety. 45 tablet 0    naloxone (NARCAN) 4 MG/0.1ML nasal spray Spray 1 spray (4 mg) into one nostril alternating nostrils as needed for opioid reversal. every 2-3 minutes until assistance arrives 0.2 mL 11    polyethylene glycol (MIRALAX) 17 GM/Dose powder Take 17 g (1 Capful) by mouth daily. 578 g 0     No current facility-administered medications for this visit.       No Known Allergies    PMH, PSH, FamHx reviewed      OBJECTIVE                                                      /72   Pulse 69   SpO2 96%     Physical Exam  Vitals and nursing note reviewed.   Constitutional:       General: He is not in acute distress.     Appearance: Normal appearance. He is not ill-appearing or diaphoretic.   HENT:      Mouth/Throat:      Mouth: Mucous membranes are moist.   Eyes:      General: No scleral icterus.  Cardiovascular:      Rate and Rhythm: Normal rate.    Pulmonary:      Effort: Pulmonary effort is normal. No respiratory distress.   Skin:     Coloration: Skin is not jaundiced or pale.   Neurological:      General: No focal deficit present.      Mental Status: He is alert and oriented to person, place, and time.   Psychiatric:         Mood and Affect: Mood normal.         Behavior: Behavior normal.         Thought Content: Thought content normal.         Judgment: Judgment normal.         Labs:    UDS:    Lab Results   Component Value Date    BUP Screen Positive (A) 01/27/2025    BZO Negative 01/27/2025    BAR Negative 01/27/2025    DENNIS Negative 01/27/2025    MAMP Negative 01/27/2025    AMP Negative 01/27/2025    MDMA Negative 01/27/2025    MTD Negative 01/27/2025    [id number] Negative 01/27/2025    OXY Negative 01/27/2025    PCP Negative 01/27/2025    THC Negative 01/27/2025    TEMP 94 F 01/27/2025    SGPOCT 1.015 01/27/2025     *POC urine drug screen does not screen for Fentanyl      Recent Results (from the past 240 hours)   Drugs of Abuse Screen Urine (POC CUPS) POCT    Collection Time: 01/27/25  1:16 PM   Result Value Ref Range    POCT Kit Lot Number [id number]     POCT Kit Expiration Date 08/05/2026     Temperature Urine POCT 94 F 90 F, 92 F, 94 F, 96 F, 98 F, 100 F    Specific [place] POCT 1.015 1.005, 1.015, 1.025    pH Qual Urine POCT 7 pH 4 pH, 5 pH, 7 pH, 9 pH    Creatinine Qual Urine POCT 50 mg/dL 20 mg/dL, 50 mg/dL, 100 mg/dL, 200 mg/dL    Internal QC Qual Urine POCT Valid Valid    Amphetamine Qual Urine POCT Negative Negative    Barbiturate Qual Urine POCT Negative Negative    Buprenorphine Qual Urine POCT Screen Positive (A) Negative    Benzodiazepine Qual Urine POCT Negative Negative    Cocaine Qual Urine POCT Negative Negative    Methamphetamine Qual Urine POCT Negative Negative    MDMA Qual Urine POCT Negative Negative    Methadone Qual Urine POCT Negative Negative    Opiate Qual Urine POCT Negative Negative    Oxycodone Qual Urine POCT Negative  Negative    Phencyclidine Qual Urine POCT Negative Negative    THC Qual Urine POCT Negative Negative            Continued Complex Management  The longitudinal plan of care for Opioid Use Disorder (OUD) was addressed during this visit. Due to the added complexity in care, I will continue to support Thor in the subsequent management and with ongoing continuity of care.    Simin Anaya [place] [id number] [place] [address] [id number] [phone]

## 2025-01-28 ENCOUNTER — TELEPHONE (OUTPATIENT)
Dept: BEHAVIORAL HEALTH | Facility: CLINIC | Age: 25
End: 2025-01-28
Payer: COMMERCIAL

## 2025-01-28 NOTE — TELEPHONE ENCOUNTER
Phone call informing pharmacy of early fill authorization. Pulsity message sent informing patient that he can  rxs today.    Lore Love RN on 1/28/2025 at 12:16 PM

## 2025-01-28 NOTE — TELEPHONE ENCOUNTER
Patient presented to the [facility] having come from [facility]. Patient reports pharmacy is able to fill Suboxone 12-3mg rx, but not 8-2mg rx.    Last Recovery Clinic visit: 1/27/25, Dr. Anaya    Per chart and patient report, patient received Suboxone 8-2mg rx from the [facility] 1/25/25, 6 day supply, at one film daily. Patient's usual dose is 20mg daily. Suboxone 8-2mg rx would be early fill today. Patient requesting authorization for early fill for Suboxone 8-2mg to return to usual dose of 20mg daily.    Routing to Dr. Anaya.    Lore Love RN on 1/28/2025 at 9:25 AM

## 2025-02-13 ENCOUNTER — OFFICE VISIT (OUTPATIENT)
Dept: BEHAVIORAL HEALTH | Facility: CLINIC | Age: 25
End: 2025-02-13
Payer: COMMERCIAL

## 2025-02-13 VITALS — DIASTOLIC BLOOD PRESSURE: 70 MMHG | HEART RATE: 80 BPM | SYSTOLIC BLOOD PRESSURE: 109 MMHG

## 2025-02-13 DIAGNOSIS — Z79.891 ENCOUNTER FOR MONITORING OPIOID MAINTENANCE THERAPY: Primary | ICD-10-CM

## 2025-02-13 DIAGNOSIS — F11.20 OPIOID USE DISORDER, SEVERE, DEPENDENCE (H): ICD-10-CM

## 2025-02-13 DIAGNOSIS — F17.200 NICOTINE USE DISORDER: ICD-10-CM

## 2025-02-13 DIAGNOSIS — Z51.81 ENCOUNTER FOR MONITORING OPIOID MAINTENANCE THERAPY: Primary | ICD-10-CM

## 2025-02-13 DIAGNOSIS — F41.8 DEPRESSION WITH ANXIETY: ICD-10-CM

## 2025-02-13 LAB
AMPHETAMINE QUAL URINE POCT: NEGATIVE
BARBITURATE QUAL URINE POCT: NEGATIVE
BENZODIAZEPINE QUAL URINE POCT: NEGATIVE
BUPRENORPHINE QUAL URINE POCT: ABNORMAL
COCAINE QUAL URINE POCT: NEGATIVE
CREATININE QUAL URINE POCT: ABNORMAL
INTERNAL QC QUAL URINE POCT: ABNORMAL
MDMA QUAL URINE POCT: NEGATIVE
METHADONE QUAL URINE POCT: NEGATIVE
METHAMPHETAMINE QUAL URINE POCT: NEGATIVE
OPIATE QUAL URINE POCT: NEGATIVE
OXYCODONE QUAL URINE POCT: NEGATIVE
PH QUAL URINE POCT: ABNORMAL
PHENCYCLIDINE QUAL URINE POCT: NEGATIVE
POCT KIT EXPIRATION DATE: ABNORMAL
POCT KIT LOT NUMBER: ABNORMAL
SPECIFIC GRAVITY POCT: 1.02
TEMPERATURE URINE POCT: ABNORMAL
THC QUAL URINE POCT: ABNORMAL

## 2025-02-13 RX ORDER — BUPRENORPHINE AND NALOXONE 12; 3 MG/1; MG/1
1 FILM, SOLUBLE BUCCAL; SUBLINGUAL 2 TIMES DAILY
Qty: 30 FILM | Refills: 0 | Status: SHIPPED | OUTPATIENT
Start: 2025-02-13

## 2025-02-13 ASSESSMENT — PATIENT HEALTH QUESTIONNAIRE - PHQ9: SUM OF ALL RESPONSES TO PHQ QUESTIONS 1-9: 13

## 2025-02-13 NOTE — NURSING NOTE
[facility]      Rooming information:    Approximate last use of full opioid agonist: 10/24/24  Taking buprenorphine? Yes: suboxone  How much per day? 20mg  Number of buprenorphine films/tablets remaining currently: 0  Side effects related to buprenorphine (constipation, dry mouth, sedation?) No   Narcan currently available: Yes  Other recent substance use:    Xanax 1mg  NICOTINE-Yes: vape/cigs  If using nicotine, ready to quit? No    Point of care urine drug screen positive for:  Lab Results   Component Value Date    BUP Screen Positive (A) 02/13/2025    BZO Negative 02/13/2025    BAR Negative 02/13/2025    DENNIS Negative 02/13/2025    MAMP Negative 02/13/2025    AMP Negative 02/13/2025    MDMA Negative 02/13/2025    MTD Negative 02/13/2025    [id number] Negative 02/13/2025    OXY Negative 02/13/2025    PCP Negative 02/13/2025    THC Screen Positive (A) 02/13/2025    TEMP 90 F 02/13/2025    SGPOCT 1.025 02/13/2025       *POC urine drug screen does not screen for Fentanyl      Depression Response    Patient completed the PHQ-9 assessment for depression and scored >9? Yes  Question 9 on the PHQ-9 was positive for suicidality? No  Does patient have current mental health provider? No  C-SSRS screener risk level.       Is this a virtual visit? No    I personally notified the following: [facility]          2/13/2025    11:00 AM   PHQ Assesment Total Score(s)   PHQ-9 Score 13         Housing needs: stable    Insurance: active    Current legal issues: none    Contact information up to date? yes    3rd Party Involvement  no today (please obtain ELI if pt would like to include)    Brianna Helm MA  February 13, 2025  11:11 AM

## 2025-02-13 NOTE — PROGRESS NOTES
[facility] Follow Up    ASSESSMENT/PLAN                                                      1. Opioid use disorder, severe, dependence (H)  Reporting persistent cravings at 20 mg daily. Increasing suboxone to 24 mg.   Patient remains engaged in peer led support groups, is attending 4 per week. Is still considering [facility], and will schedule christy eval if he decides to pursue treatment.   Confirms narcan access  - Drugs of Abuse Screen Urine (POC CUPS) POCT  - Buprenorphine HCl-Naloxone HCl (SUBOXONE) 12-3 MG FILM per film; Place 1 Film under the tongue 2 times daily.  Dispense: 30 Film; Refill: 0    2. Encounter for monitoring opioid maintenance therapy (Primary)  - Drugs of Abuse Screen Urine (POC CUPS) POCT    3. Depression with anxiety  Overall stable. Is taking fluoxetine daily, has refills available on profile at pharmacy.     4. Nicotine use disorder  Is vaping, not interested in quitting at this time.          Return in about 2 weeks (around 2/27/2025) for Follow up, in person 1230.      Patient counseling completed today:  Discussed mechanism of action, potential risks/benefits/side effects of medications and other recommendations above.     Discussed risk of precipitated withdrawal with initiation of buprenorphine in the presence of full opioid agonists.    Reviewed directions for initiation of buprenorphine to reduce risk of precipitated withdrawal and maximize efficacy.    Harm reduction counseling including never use alone, availability of naloxone, risks associated with concurrent use of opioids and benzodiazepines, alcohol, or other sedatives, safer administration as applicable.  Discussed importance of avoiding isolation, building a network of supportive relationships, avoiding people/places/things associated with past use to reduce risk of relapse; including motivational interviewing regarding psychosocial interventions.   SUBJECTIVE                                                           CC/HPI:  Rambo Scott is a 24 year old male with PMH seizure 7/25/24 after starting bupropion, depression,  anxiety, tobacco use disorder, stimulant use disorder,  and opioid use disorder on buprenorphine who presents to the [facility] for return visit.       Brief History:  Rambo Scott was first seen in Recovery Clinic on 08/01/24. They were referred by [facility]ienna to continue buprenorphine as he began IOP with [facility].  Continued buprenorphine 12mg/day through  at initial visit.   - Brief return to use after initial visit in setting of death of friend's sister  - 8/29/24 buprenorphine increased to 16mg/day  - 1 1/2 week return to use of opioids and other substances 10/2024 while on trip to [facility]  - 10/25/24 buprenorphine increased to 20mg/day.  Started programming with [facility]  - 1/2025 pt had left [facility], interested in OP with [facility]    Recommendations last visit: 1/27/2025  1. Opioid use disorder, severe, dependence (H) (Primary)  2. Encounter for monitoring opioid maintenance therapy  Needs improvement.  He remains abstinent from opioid use and stable on Suboxone without side effects, however he continues to have ongoing cravings.  He reflects a lot on his recovery and what he has learned and what he hopes to learn.  He is motivated to resume outpatient treatment but it will need to fit into his class schedule.  We scheduled an JILL evaluation for tomorrow (1/28) @ 8am.  Good discussion regarding Sublocade - he noted that everyone seems to suggest this when he comes to clinic and he wanted to be sure there was no financial incentive for us to encourage this.  I reassured him (at his request, a pinky promise) that there is zero financial gain for any provider here to recommend Sublocade but collectively we find that most patients love it and it provides additional stability to their recovery.  We discussed the benefits, especially the fact that it eliminates the question of stopping  "buprenorphine to use.  He was appreciative and receptive to this information and will continue to consider.  For now we will continue Suboxone 20mg TDD as ordered.  Confirms access to Narcan.    - Drugs of Abuse Screen Urine (POC CUPS) POCT  - Adult Mental Health  Referral; Future  - buprenorphine HCl-naloxone HCl (SUBOXONE) 8-2 MG per film; Place 1 Film under the tongue daily.  Dispense: 15 Film; Refill: 0  - Buprenorphine HCl-Naloxone HCl (SUBOXONE) 12-3 MG FILM per film; Place 1 Film under the tongue daily.  Dispense: 15 Film; Refill: 0     3. Nicotine use disorder  Not ready to quit.  Continue to reassess.          02/13/25 HPI:  Brief one time use of xanax, triggered but turbulent relationship stress. Called his friend who he knew was using fentanyl, did not end up using fentanyl Took his last xanax he had been holding onto for the last 6 months \"just in case\"  Still having issues with SO, is considering moving back to his mom's until they can figure out next steps.  Still taking 20 mg of suboxone daily. Still having persistent cravings Would like an increase  Constipation managed with miralax  Is going to 4 meetings weekly which he is enjoying. Still considering [facility], has number to reschedule missed christy eval.   Still in school full time, taking general. Is considering majoring in political science.       Substance Use History :  Opioids:   Age at first use: [age] started using fentanyl. Was tired of being sick so he was began using Oxycodone 200 mg daily.   Current use: substance: Oxycodone; quantity 200 mg route: insufflation ; timing of last use: 10/24/24                IV drug use: No   History of overdose: No  Previous residential or outpatient treatments for addiction : Yes:  [facility]  Previous medication treatments for addiction: Yes: Suboxone  Longest period of sobriety: 8/14/24 to present  Medical complications related to substance use: none known  Hepatitis C: Negative on 7/17/23, reports " "more recent screening in July 2024   HIV: Non-reactive on 7/17/23, reports more recent screening in July 2024      Other Addiction History:  Stimulants   H/o daily cocaine use for 5 months prior to treatment; last use 10/2024  Sedatives/hypnotics/anxiolytics:   Occasional use  of Benzos, last use 10/2024  Alcohol:   Denies  Nicotine:   Vaping and smoking  Cannabis:   H/o of daily use, \"was a problem until going to treatment.\"  Hallucinogens/Dissociatives:   Has tried a few times  Eating disorder:  Denies  Gambling:              Once     PAST PSYCHIATRIC HISTORY:  Diagnoses- Depression and anxiety  Suicide Attempts: No   Hospitalizations: No       1/9/2025     3:00 PM 1/27/2025     1:00 PM 2/13/2025    11:00 AM   PHQ   PHQ-9 Total Score 8 11 13   Q9: Thoughts of better off dead/self-harm past 2 weeks Not at all Not at all Not at all     Social History  Housing status: with his girlfriend or his mother  Education: HS graduate  Employment status: Unemployed, not seeking work  Relationship status: Partnered  Children: no children  Legal: denies         Labs discussed with patient?  Yes      [place] Prescription Drug Monitoring Program Reviewed:  Yes  01/28/2025 01/27/2025 2 Buprenorphine-Nalox 8-2mg Film 15.00 15 Ka Par [id number] Paddy ([id number]) 0/0 8.00 mg [place]   01/27/2025 01/27/2025 2 Buprenorphine-Nalox 12-3mg Flm 15.00 15 Ka Par [id number] Paddy ([id number]) 0/0 12.[place]   01/25/2025 01/25/2025 2 Buprenorphine-Nalox 8-2mg Film 6.00 6 Dz Nta [id number] Paddy ([id number]) 0/0 8.[place]   01/09/2025 01/09/2025 2 Buprenorphine-Nalox 12-3mg Flm 15.00 15 Li Vol             Medications:  Current Outpatient Medications   Medication Sig Dispense Refill    Buprenorphine HCl-Naloxone HCl (SUBOXONE) 12-3 MG FILM per film Place 1 Film under the tongue 2 times daily. 30 Film 0    acetylcysteine (N-ACETYL CYSTEINE) 600 MG CAPS capsule Take 2 capsules (1,200 mg) by mouth 2 times daily. 120 capsule 11    cloNIDine (CATAPRES) 0.1 " MG tablet Take 1 tablet (0.1 mg) by mouth 3 times daily as needed (anxiety). 45 tablet 0    docusate sodium (COLACE) 100 MG capsule Take 1 capsule (100 mg) by mouth 2 times daily. 60 capsule 1    FLUoxetine (PROZAC) 40 MG capsule Take 1 capsule (40 mg) by mouth daily. 90 capsule 3    hydrOXYzine HCl (ATARAX) 25 MG tablet Take 1 tablet (25 mg) by mouth 3 times daily as needed for anxiety. 45 tablet 0    naloxone (NARCAN) 4 MG/0.1ML nasal spray Spray 1 spray (4 mg) into one nostril alternating nostrils as needed for opioid reversal. every 2-3 minutes until assistance arrives 0.2 mL 11    polyethylene glycol (MIRALAX) 17 GM/Dose powder Take 17 g (1 Capful) by mouth daily. 578 g 0     No current facility-administered medications for this visit.       No Known Allergies    PMH, PSH, FamHx reviewed      OBJECTIVE                                                      /70   Pulse 80     Physical Exam    Labs:    UDS:    Lab Results   Component Value Date    BUP Screen Positive (A) 02/13/2025    BZO Negative 02/13/2025    BAR Negative 02/13/2025    DENNIS Negative 02/13/2025    MAMP Negative 02/13/2025    AMP Negative 02/13/2025    MDMA Negative 02/13/2025    MTD Negative 02/13/2025    [id number] Negative 02/13/2025    OXY Negative 02/13/2025    PCP Negative 02/13/2025    THC Screen Positive (A) 02/13/2025    TEMP 90 F 02/13/2025    SGPOCT 1.025 02/13/2025     *POC urine drug screen does not screen for Fentanyl      Recent Results (from the past 240 hours)   Drugs of Abuse Screen Urine (POC CUPS) POCT    Collection Time: 02/13/25 11:24 AM   Result Value Ref Range    POCT Kit Lot Number [id number]     POCT Kit Expiration Date 8361661     Temperature Urine POCT 90 F 90 F, 92 F, 94 F, 96 F, 98 F, 100 F    Specific [place] POCT 1.025 1.005, 1.015, 1.025    pH Qual Urine POCT 5 pH 4 pH, 5 pH, 7 pH, 9 pH    Creatinine Qual Urine POCT 50 mg/dL 20 mg/dL, 50 mg/dL, 100 mg/dL, 200 mg/dL    Internal QC Qual Urine POCT Valid Valid     Amphetamine Qual Urine POCT Negative Negative    Barbiturate Qual Urine POCT Negative Negative    Buprenorphine Qual Urine POCT Screen Positive (A) Negative    Benzodiazepine Qual Urine POCT Negative Negative    Cocaine Qual Urine POCT Negative Negative    Methamphetamine Qual Urine POCT Negative Negative    MDMA Qual Urine POCT Negative Negative    Methadone Qual Urine POCT Negative Negative    Opiate Qual Urine POCT Negative Negative    Oxycodone Qual Urine POCT Negative Negative    Phencyclidine Qual Urine POCT Negative Negative    THC Qual Urine POCT Screen Positive (A) Negative                Continued Complex Management  The longitudinal plan of care for Opioid Use Disorder (OUD) was addressed during this visit. Due to the added complexity in care, I will continue to support Thor in the subsequent management and with ongoing continuity of care.    Santa Wilburn, CNP    [facility]  [address] [place] [id number] [phone]

## 2025-02-28 ENCOUNTER — TELEPHONE (OUTPATIENT)
Dept: BEHAVIORAL HEALTH | Facility: CLINIC | Age: 25
End: 2025-02-28
Payer: COMMERCIAL

## 2025-02-28 NOTE — TELEPHONE ENCOUNTER
"Pt is a(n) adult (18+ out of HS) Seeking as eval for Adult JILL Assessment for primary substance use treatment (OP JILL programs including Co-occurring).  Appointment scheduled by:  Patient.  (self-pay - complete Cost Estimate)  Caller name:  patient    Caller phone #: yes  Legal Guardianship Reviewed?  No  Honoring Choices Notified?  No  Brief reason for appt:  listed above     needed?  NO    Contact information verified/updated: Yes    If appt is for adult JILL program location, confirm you have verified the location and address with the patient referring to the template header.  Yes    Marquez Barr    \"We have scheduled your evaluation. In the event that your insurance coverage comes back as out of network, you may receive a call to cancel your appointment and direct you to your insurance company for in-network coverage.\"    Disclaimer regarding insurance read to patient?  Yes  Informed patient [place] are for programming that is in person in the [place] area?  Yes - proceed with scheduling   "

## 2025-03-03 ENCOUNTER — VIRTUAL VISIT (OUTPATIENT)
Dept: BEHAVIORAL HEALTH | Facility: CLINIC | Age: 25
End: 2025-03-03
Payer: COMMERCIAL

## 2025-03-03 VITALS — HEART RATE: 72 BPM | DIASTOLIC BLOOD PRESSURE: 75 MMHG | OXYGEN SATURATION: 98 % | SYSTOLIC BLOOD PRESSURE: 115 MMHG

## 2025-03-03 DIAGNOSIS — Z79.891 ENCOUNTER FOR MONITORING OPIOID MAINTENANCE THERAPY: ICD-10-CM

## 2025-03-03 DIAGNOSIS — F11.20 OPIOID USE DISORDER, SEVERE, DEPENDENCE (H): ICD-10-CM

## 2025-03-03 DIAGNOSIS — F11.20 OPIOID USE DISORDER, SEVERE, DEPENDENCE (H): Primary | ICD-10-CM

## 2025-03-03 DIAGNOSIS — Z51.81 ENCOUNTER FOR MONITORING OPIOID MAINTENANCE THERAPY: ICD-10-CM

## 2025-03-03 DIAGNOSIS — F41.8 DEPRESSION WITH ANXIETY: ICD-10-CM

## 2025-03-03 LAB
AMPHETAMINE QUAL URINE POCT: NEGATIVE
BARBITURATE QUAL URINE POCT: NEGATIVE
BENZODIAZEPINE QUAL URINE POCT: NEGATIVE
BUPRENORPHINE QUAL URINE POCT: ABNORMAL
COCAINE QUAL URINE POCT: ABNORMAL
CREATININE QUAL URINE POCT: ABNORMAL
FENTANYL UR QL: NORMAL
INTERNAL QC QUAL URINE POCT: ABNORMAL
MDMA QUAL URINE POCT: NEGATIVE
METHADONE QUAL URINE POCT: NEGATIVE
METHAMPHETAMINE QUAL URINE POCT: NEGATIVE
OPIATE QUAL URINE POCT: NEGATIVE
OXYCODONE QUAL URINE POCT: ABNORMAL
PH QUAL URINE POCT: ABNORMAL
PHENCYCLIDINE QUAL URINE POCT: NEGATIVE
POCT KIT EXPIRATION DATE: ABNORMAL
POCT KIT LOT NUMBER: ABNORMAL
SPECIFIC GRAVITY POCT: 1.02
TEMPERATURE URINE POCT: ABNORMAL
THC QUAL URINE POCT: ABNORMAL

## 2025-03-03 PROCEDURE — 80307 DRUG TEST PRSMV CHEM ANLYZR: CPT

## 2025-03-03 PROCEDURE — 80305 DRUG TEST PRSMV DIR OPT OBS: CPT | Performed by: NURSE PRACTITIONER

## 2025-03-03 RX ORDER — BUPRENORPHINE AND NALOXONE 12; 3 MG/1; MG/1
1 FILM, SOLUBLE BUCCAL; SUBLINGUAL 2 TIMES DAILY
Qty: 30 FILM | Refills: 0 | Status: SHIPPED | OUTPATIENT
Start: 2025-03-03

## 2025-03-03 RX ORDER — CLONIDINE HYDROCHLORIDE 0.1 MG/1
0.1 TABLET ORAL 2 TIMES DAILY PRN
Qty: 60 TABLET | Refills: 0 | Status: SHIPPED | OUTPATIENT
Start: 2025-03-03

## 2025-03-03 RX ORDER — HYDROXYZINE HYDROCHLORIDE 25 MG/1
25 TABLET, FILM COATED ORAL 2 TIMES DAILY PRN
Qty: 60 TABLET | Refills: 0 | Status: SHIPPED | OUTPATIENT
Start: 2025-03-03

## 2025-03-03 ASSESSMENT — PATIENT HEALTH QUESTIONNAIRE - PHQ9: SUM OF ALL RESPONSES TO PHQ QUESTIONS 1-9: 13

## 2025-03-03 NOTE — PROGRESS NOTES
[facility] Follow Up    ASSESSMENT/PLAN                                                    1. Opioid use disorder, severe, dependence (H) (Primary)  Overall symptoms well controlled following dose increase of buprenorphine to 24 mg/day. Reports single episode of oxycodone use after losing remaining rx of buprenorphine on 3/1/25. Has since resumed buprenorphine. No withdrawal symptoms.   Continue Suboxone 12-3 mg BID.   Confirms access to [facility] tomorrow, encouraged pt to follow recommendation of    - Drugs of Abuse Screen Urine (POC CUPS) POCT  - Fentanyl Qualitative with Reflex to Quant Urine; Future  - Buprenorphine HCl-Naloxone HCl (SUBOXONE) 12-3 MG FILM per film; Place 1 Film under the tongue 2 times daily.  Dispense: 30 Film; Refill: 0    2. Encounter for monitoring opioid maintenance therapy  Labs as below   - Drugs of Abuse Screen Urine (POC CUPS) POCT  - Fentanyl Qualitative with Reflex to Quant Urine; Future  - Buprenorphine HCl-Naloxone HCl (SUBOXONE) 12-3 MG FILM per film; Place 1 Film under the tongue 2 times daily.  Dispense: 30 Film; Refill: 0    3. Depression with anxiety  Mood stable. Continue Fluoxetine 40 mg daily.   Change Clonidine to 0.1 mg BID PRN   Change Hydroxyzine to 25 mg BID PRN   - cloNIDine (CATAPRES) 0.1 MG tablet; Take 1 tablet (0.1 mg) by mouth 2 times daily as needed (anxiety or sleep).  Dispense: 60 tablet; Refill: 0  - hydrOXYzine HCl (ATARAX) 25 MG tablet; Take 1 tablet (25 mg) by mouth 2 times daily as needed for anxiety (sleep).  Dispense: 60 tablet; Refill: 0         Return in about 2 weeks (around 3/17/2025) for Follow up, with any available provider, in person.      Patient counseling completed today:  Discussed mechanism of action, potential risks/benefits/side effects of medications and other recommendations above.     Discussed risk of precipitated withdrawal with initiation of buprenorphine in the presence of full opioid  "agonists.    Reviewed directions for initiation of buprenorphine to reduce risk of precipitated withdrawal and maximize efficacy.    Harm reduction counseling including never use alone, availability of naloxone, risks associated with concurrent use of opioids and benzodiazepines, alcohol, or other sedatives, safer administration as applicable.  Discussed importance of avoiding isolation, building a network of supportive relationships, avoiding people/places/things associated with past use to reduce risk of relapse; including motivational interviewing regarding psychosocial interventions.   SUBJECTIVE                                                        CC/HPI:  Rambo Scott is a 24 year old male with PMH seizure 7/25/24 after starting bupropion, depression,  anxiety, tobacco use disorder, stimulant use disorder,  and opioid use disorder on buprenorphine who presents to the Recovery [facility] for return visit.      Telehealth visit detail:   Type of service: Video audio   Patient location: Recovery Clinic  Provider Location: off site   Platform utilized:   Start time: 1:46 PM   End time: 1:59 PM       Brief History:  Rambo Scott was first seen in Recovery Clinic on 08/01/24. They were referred by Sienna to continue buprenorphine as he began IOP with [facility].  Continued buprenorphine 12mg/day through  at initial visit.   - Brief return to use after initial visit in setting of death of friend's sister  - 8/29/24 buprenorphine increased to 16mg/day  - 1 1/2 week return to use of opioids and other substances 10/2024 while on trip to [facility]  - 10/25/24 buprenorphine increased to 20mg/day.  Started programming with [facility]  - 1/2025 pt had left [facility], interested in OP with [facility]  - 2/13/25 dose increased to 24 mg/day for cravings     03/03/25 HPI:  - Has been going \"not the best\" Last Friday, 2/28/25 he lost his Suboxone, he had enough to last over the weekend, but when he found out he lost it, it was past " "business hours and he would have to go to ED. Could not afford expensive ED visit. By Saturday he decided to \"go party with the homies\"   - used 1 Perc 30 pill and cocaine, and drank some alcohol. Only alcohol use since Oct 2024.   - \"half fear of withdrawal, and half taking the opportunity\"   - when he used Percocet, no euphoric effects, thinks he still have buprenorphine in his system.   - reports onset of withdrawal was on Sunday. He stayed at home on Sunday, was able to get Suboxone 8 mg film from his friend, took that yesterday, no withdrawal, symptoms improved.   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 He is hoping to resume outpatient   - lives with mother currently, safe and supportive environment   - requesting refill of clonidine and hydroxyzine. Using clonidine and hydroxyzine for sleep   - miralax prn for constipation     Recommendations last visit:   A/P from 2/13/25  1. Opioid use disorder, severe, dependence (H)  Reporting persistent cravings at 20 mg daily. Increasing suboxone to 24 mg.   Patient remains engaged in peer led support groups, is attending 4 per week. Is still considering [facility], and will schedule christy eval if he decides to pursue treatment.   Confirms narcan access  - Drugs of Abuse Screen Urine (POC CUPS) POCT  - Buprenorphine HCl-Naloxone HCl (SUBOXONE) 12-3 MG FILM per film; Place 1 Film under the tongue 2 times daily.  Dispense: 30 Film; Refill: 0     2. Encounter for monitoring opioid maintenance therapy (Primary)  - Drugs of Abuse Screen Urine (POC CUPS) POCT     3. Depression with anxiety  Overall stable. Is taking fluoxetine daily, has refills available on profile at pharmacy.      4. Nicotine use disorder  Is vaping, not interested in quitting at this time.       Substance Use History :  Opioids:   Age at first use: [age] started using fentanyl. Was tired of being sick so he " "was began using Oxycodone 200 mg daily.   Current use: substance: Oxycodone; quantity 200 mg route: insufflation ; timing of last use: 3/1/25                IV drug use: No   History of overdose: No  Previous residential or outpatient treatments for addiction : Yes:  [facility]  Previous medication treatments for addiction: Yes: Suboxone  Longest period of sobriety: 8/14/24 to present  Medical complications related to substance use: none known  Hepatitis C: Negative on 7/17/23, reports more recent screening in July 2024   HIV: Non-reactive on 7/17/23, reports more recent screening in July 2024      Other Addiction History:  Stimulants   H/o daily cocaine use for 5 months prior to treatment; last use 10/2024  Sedatives/hypnotics/anxiolytics:   Occasional use  of Benzos, last use 10/2024  Alcohol:   Denies  Nicotine:   Vaping and smoking  Cannabis:   H/o of daily use, \"was a problem until going to treatment.\"  Hallucinogens/Dissociatives:   Has tried a few times  Eating disorder:  Denies  Gambling:              Once     PAST PSYCHIATRIC HISTORY:  Diagnoses- Depression and anxiety  Suicide Attempts: No   Hospitalizations: No     Social History  Housing status: with his mother  Education: HS graduate  Employment status: Unemployed, not seeking work  Relationship status: Partnered  Children: no children  Legal: denies         1/27/2025     1:00 PM 2/13/2025    11:00 AM 3/3/2025     1:00 PM   PHQ   PHQ-9 Total Score 11 13 13   Q9: Thoughts of better off dead/self-harm past 2 weeks Not at all Not at all Not at all         Labs discussed with patient?  Yes      [place] Prescription Drug Monitoring Program Reviewed:  Yes  02/13/2025 02/13/2025 2 Buprenorphine-Nalox 12-3mg Flm 30.00 15 He Bat [id number] Paddy ([id number]) 0/0 24.[place] mg [place]   01/28/2025 01/27/2025 2 Buprenorphine-Nalox 8-2mg Film 15.00 15 Ka Par [id number] Paddy ([id number]) 0/0 8.[place]   01/27/2025 01/27/2025 2 Buprenorphine-Nalox 12-3mg Flm 15.00 15 Ka Par " [id number] Paddy ([id number]) 0/0 12.[place]   01/25/2025 01/25/2025 2 Buprenorphine-Nalox 8-2mg Film 6.00 6 Dz Nta [id number] Paddy ([id number]) 0/0 8.[place]       Medications:  Current Outpatient Medications   Medication Sig Dispense Refill    acetylcysteine (N-ACETYL CYSTEINE) 600 MG CAPS capsule Take 2 capsules (1,200 mg) by mouth 2 times daily. 120 capsule 11    Buprenorphine HCl-Naloxone HCl (SUBOXONE) 12-3 MG FILM per film Place 1 Film under the tongue 2 times daily. 30 Film 0    cloNIDine (CATAPRES) 0.1 MG tablet Take 1 tablet (0.1 mg) by mouth 2 times daily as needed (anxiety or sleep). 60 tablet 0    docusate sodium (COLACE) 100 MG capsule Take 1 capsule (100 mg) by mouth 2 times daily. 60 capsule 1    FLUoxetine (PROZAC) 40 MG capsule Take 1 capsule (40 mg) by mouth daily. 90 capsule 3    hydrOXYzine HCl (ATARAX) 25 MG tablet Take 1 tablet (25 mg) by mouth 2 times daily as needed for anxiety (sleep). 60 tablet 0    naloxone (NARCAN) 4 MG/0.1ML nasal spray Spray 1 spray (4 mg) into one nostril alternating nostrils as needed for opioid reversal. every 2-3 minutes until assistance arrives 0.2 mL 11    polyethylene glycol (MIRALAX) 17 GM/Dose powder Take 17 g (1 Capful) by mouth daily. 578 g 0     No current facility-administered medications for this visit.       No Known Allergies    PMH, PSH, FamHx reviewed      OBJECTIVE                                                      /75   Pulse 72   SpO2 98%     Physical Exam  Vitals and nursing note reviewed.   Constitutional:       General: He is not in acute distress.     Appearance: Normal appearance.   Eyes:      Extraocular Movements: Extraocular movements intact.   Pulmonary:      Effort: Pulmonary effort is normal.   Neurological:      Mental Status: He is alert and oriented to person, place, and time.   Psychiatric:         Attention and Perception: Attention and perception normal.         Mood and Affect: Mood and affect normal.         Speech:  Speech normal.         Behavior: Behavior is cooperative.         Thought Content: Thought content normal.      Comments: Insight and judgment fair. Reflective. Forward thinking.          Labs:    UDS:    Lab Results   Component Value Date    BUP Screen Positive (A) 03/03/2025    BZO Negative 03/03/2025    BAR Negative 03/03/2025    DENNIS Screen Positive (A) 03/03/2025    MAMP Negative 03/03/2025    AMP Negative 03/03/2025    MDMA Negative 03/03/2025    MTD Negative 03/03/2025    [id number] Negative 03/03/2025    OXY Screen Positive (A) 03/03/2025    PCP Negative 03/03/2025    THC Screen Positive (A) 03/03/2025    TEMP 90 F 03/03/2025    SGPOCT 1.025 03/03/2025     *POC urine drug screen does not screen for Fentanyl      Recent Results (from the past 240 hours)   Drugs of Abuse Screen Urine (POC CUPS) POCT    Collection Time: 03/03/25  1:34 PM   Result Value Ref Range    POCT Kit Lot Number [id number]     POCT Kit Expiration Date 08/05/2026     Temperature Urine POCT 90 F 90 F, 92 F, 94 F, 96 F, 98 F, 100 F    Specific [place] POCT 1.025 1.005, 1.015, 1.025    pH Qual Urine POCT 5 pH 4 pH, 5 pH, 7 pH, 9 pH    Creatinine Qual Urine POCT 200 mg/dL 20 mg/dL, 50 mg/dL, 100 mg/dL, 200 mg/dL    Internal QC Qual Urine POCT Valid Valid    Amphetamine Qual Urine POCT Negative Negative    Barbiturate Qual Urine POCT Negative Negative    Buprenorphine Qual Urine POCT Screen Positive (A) Negative    Benzodiazepine Qual Urine POCT Negative Negative    Cocaine Qual Urine POCT Screen Positive (A) Negative    Methamphetamine Qual Urine POCT Negative Negative    MDMA Qual Urine POCT Negative Negative    Methadone Qual Urine POCT Negative Negative    Opiate Qual Urine POCT Negative Negative    Oxycodone Qual Urine POCT Screen Positive (A) Negative    Phencyclidine Qual Urine POCT Negative Negative    THC Qual Urine POCT Screen Positive (A) Negative            Continued Complex Management  The longitudinal plan of care for Opioid Use  Disorder (OUD) was addressed during this visit. Due to the added complexity in care, I will continue to support Thor in the subsequent management and with ongoing continuity of care.    OBINNA Browne [place] [id number] [place] [id number] [phone]

## 2025-03-03 NOTE — NURSING NOTE
[facility]      Rooming information:    Approximate last use of full opioid agonist: 3/1/2025, perc 30  Taking buprenorphine? Yes:   How much per day? 20mg  Number of buprenorphine films/tablets remaining currently: 0  Side effects related to buprenorphine (constipation, dry mouth, sedation?) No   Narcan currently available: Yes  Other recent substance use:    Alcohol , Cannabis , and Cocaine   NICOTINE-Yes: cigs and vape  If using nicotine, ready to quit? No    Point of care urine drug screen positive for:  Lab Results   Component Value Date    BUP Screen Positive (A) 03/03/2025    BZO Negative 03/03/2025    BAR Negative 03/03/2025    DENNIS Screen Positive (A) 03/03/2025    MAMP Negative 03/03/2025    AMP Negative 03/03/2025    MDMA Negative 03/03/2025    MTD Negative 03/03/2025    HVV338 Negative 03/03/2025    OXY Screen Positive (A) 03/03/2025    PCP Negative 03/03/2025    THC Screen Positive (A) 03/03/2025    TEMP 90 F 03/03/2025    SGPOCT 1.025 03/03/2025       *POC urine drug screen does not screen for Fentanyl      Depression Response    Patient completed the PHQ-9 assessment for depression and scored >9? Yes  Question 9 on the PHQ-9 was positive for suicidality? No  Does patient have current mental health provider? No  C-SSRS screener risk level.       Is this a virtual visit? No    I personally notified the following: visit provider          3/3/2025     1:00 PM   PHQ Assesment Total Score(s)   PHQ-9 Score 13         Housing needs: stable     Insurance: active     Current legal issues: none     Contact information up to date? yes     3rd Party Involvement  none today (please obtain ELI if pt would like to include)    Berenice Diallo MA  March 3, 2025  1:33 PM

## 2025-03-04 ENCOUNTER — HOSPITAL ENCOUNTER (OUTPATIENT)
Dept: BEHAVIORAL HEALTH | Facility: CLINIC | Age: 25
Discharge: HOME | End: 2025-03-04
Attending: PSYCHIATRY & NEUROLOGY | Admitting: PSYCHIATRY & NEUROLOGY
Payer: COMMERCIAL

## 2025-03-04 PROCEDURE — H0001 ALCOHOL AND/OR DRUG ASSESS: HCPCS

## 2025-03-04 ASSESSMENT — PATIENT HEALTH QUESTIONNAIRE - PHQ9: SUM OF ALL RESPONSES TO PHQ QUESTIONS 1-9: 16

## 2025-03-04 ASSESSMENT — ANXIETY QUESTIONNAIRES
7. FEELING AFRAID AS IF SOMETHING AWFUL MIGHT HAPPEN: MORE THAN HALF THE DAYS
3. WORRYING TOO MUCH ABOUT DIFFERENT THINGS: MORE THAN HALF THE DAYS
2. NOT BEING ABLE TO STOP OR CONTROL WORRYING: SEVERAL DAYS
GAD7 TOTAL SCORE: 8
GAD7 TOTAL SCORE: 8
1. FEELING NERVOUS, ANXIOUS, OR ON EDGE: SEVERAL DAYS
4. TROUBLE RELAXING: SEVERAL DAYS
6. BECOMING EASILY ANNOYED OR IRRITABLE: SEVERAL DAYS
5. BEING SO RESTLESS THAT IT IS HARD TO SIT STILL: NOT AT ALL

## 2025-03-04 ASSESSMENT — COLUMBIA-SUICIDE SEVERITY RATING SCALE - C-SSRS
2. HAVE YOU ACTUALLY HAD ANY THOUGHTS OF KILLING YOURSELF?: NO
1. HAVE YOU WISHED YOU WERE DEAD OR WISHED YOU COULD GO TO SLEEP AND NOT WAKE UP?: NO

## 2025-03-04 NOTE — PROGRESS NOTES
Individual Session Summary   START TIME: 1:45 PM   END TIME: 3:20 PM    Duration: 1 hour 35 minutes       Data:  Met with client on this date for an individual substance use assessment. We completed the substance use assessment and signed ROIs. We discussed the purpose of collateral contacts, patient was agreeable to this writer reaching out and signed ROIs.      Intervention: Motivational interviewing to complete individual substance use assessment     Assessment: Patient was engaged during the assessment.      Plan: This writer will complete the JILL assessment and follow up with their collateral contact. Patient will be referred to program based on assessment outcome.         EVERETT Carrera  3/4/2025

## 2025-03-04 NOTE — PROGRESS NOTES
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 in with using marijuana and I think that is a warning sign that he is going to start struggling again. I do not think he needs residential treatment again I do think he needs some venues where he can talk about his issues and work on himself. His main drug of choice is opioids and I think he is doing all the right things, staying in contact but he has isolated himself in other ways.     If court related records were reviewed, summarize here: NA    Information from collateral contacts supported/largely agreed with information from the client and associated risk ratings.    Information in this assessment was obtained from the medical record and provided by patient who is a good historian.    Patient will have open access to their mental health medical record.    Diagnostic Criteria: 1.) Alcohol/drug is often taken in larger amounts or over a longer period than was intended.  Met for Cannabis, Opiates, and Cocaine.  2.) There is a persistent desire or unsuccessful efforts to cut down or control alcohol/drug use.  Met for Cannabis and Opiates.  3.) A great deal of time is spent in activities necessary to obtain alcohol, use alcohol, or recover from its effects.  Met for  Cannabis, Opiates, and Cocaine.  4.) Craving, or a strong desire or urge to use alcohol/drug.  Met for Cannabis, Opiates, and Cocaine.  5.) Recurrent alcohol/drug use resulting in a failure to fulfill major role obligations at work, school or home.  Met for Cannabis.  6.) Continued alcohol use despite having persistent or recurrent social or interpersonal problems caused or exacerbated by the effects of alcohol/drug.  Met for Cannabis, Opiates, and Cocaine.  7.) Important social, occupational, or recreational activities are given up or reduced because of alcohol/drug use.  Met for Cannabis, Opiates, and Cocaine.  8.) Recurrent alcohol/drug use in situations in which it is physically hazardous.  Met for Cannabis and Opiates.  9.) Alcohol/drug use is continued despite knowledge of having a persistent or recurrent physical or psychological problem that is likely to have been caused or exacerbated by alcohol.  Met for Cannabis, Opiates, and Cocaine.  10.) Tolerance, as defined by either of the following: A need for markedly increased amounts of alcohol/drug to achieve intoxication or desired effect..  Met for Cannabis, Opiates, and Cocaine.  11.) Withdrawal, as manifested by either of the following: Alcohol/drug (or a closely related substance, such as a benzodiazepine) is taken to relieve or avoid withdrawal symptoms.. Met for Opiates.     As evidenced by self report and criteria, client meets the following DSM5 Diagnoses:   (Sustained by DSM5 Criteria Listed Above)  304.30 (F12.20) Cannabis Use Disorder Severe     Opioid Use Disorder, Specify if:  On a maintenance therapy with a severity of:  304.00 (F11.20) Severe  Stimulant Use Disorder:   , Specify current severity:  Severe  304.20 (F14.20) Severe, Cocaine.    Functional Status:  Patient reports the following functional impairments:  academic performance, educational activities, money management, relationship(s), and social interactions.     J[facility]/DUAL Programmatic  Care:  1. Does the patient have a history of vulnerability such as being teased, picked on, or other indications of potential safety issues with other residents?  No    2. Does this patient have a history of being the victim of abuse? No history of abuse reported or documented.    3. Does this patient have a history of victimizing others? No     4. Does the patient have a history of boundary violations?  No.    5. Does the patient have a history of other sexual acting out behaviors (e.g grooming)?   No    6. Does the patient have a history of threats to self or others? Fire setting, running away or other self-injurious behaviors?    No    7. Does the patient s history indicate the need for special precautions or particular staffing patterns in the facility?  No    Recommendations:     1. Plan for Safety and Risk Management:  Recommended that patient call 911 or go to the local ED should there be a change in any of these risk factors.      Report to child / adult protection services was NA.     2. [facility] Referrals:   Recommendations: Patient meets criteria for the following levels of care based on ASAM Criteria:  Withdrawal Management - No Withdrawal Management Indicated, Treatment/Recovery Services - 2.1 Intensive Outpatient Services.  Patient's placement align's with the assessment and placement level of care recommendation based on current ASAM Dimension scale ratings.  Patient reports they are willing to follow these recommendations.  Patient would like the following family or other support people involved in their treatment:  NA. Patient has a history of opiate use and was give treatment options, including Medication Assisted Treatment, and information on the risks of opiod use disorder including recognizing and responding to opiod overdose.    3. Mental Health Referrals:  Individual therapy      4. Clinical Substantiation/medical necessity for the above recommendations:  Rambo has a long standing history of  polysubstance use. He has some insight into his substance use and mental health concerns. Rambo has limited coping skills other than isolation as most of his friends use. His primary support people are his mother and significant other. Rambo is not working with any mental health providers at this time. He is engaged with addiction medicine via Recovery clinic. He is at moderate to high risk for return to use and relapse at this time. Rambo prefers an evening group due to his school schedule.     5. Patient's identified no culturally specific needs related to treatment placement.     6. Recommendations for treatment focus:   Depressed Mood - patient endorses depression   Anxiety - patient endorses anxiety   Alcohol / Substance Use - Cannabis Use Disorder, severe; cocaine use disorder, severe and opiate use disorder, severe .     7.  Interactive Complexity: No    8. Safety Plan:      Lyle Safety Plan      Creation Date: 3/4/25       Step 1: Warning signs:    Warning Signs    Maira    Isolation    Lack of sleep      Step 2: Internal coping strategies - Things I can do to take my mind off my problems without contacting another person:    Strategies    Distraction    TV    Video games    Dogs      Step 3: People and social settings that provide distraction:    Name Contact Information    I do not usually reach out to anyone- Isolation        Places    mothers home      Step 4: People whom I can ask for help during a crisis:   No contacts identified     Step 5: Professionals or agencies I can contact during a crisis:      Suicide Prevention Lifeline Phone: Call or Text 455  Crisis Text Line: Text HOME to 856703     Step 6: Making the environment safer (plan for lethal means safety):   Did not identify any lethal methods     Optional: What is most important to me and worth living for?:      Lyle Safety Plan. Felicita Bangura and Daniel Fuchs. Used with permission of the authors.       Provider Name/  Credentials:  Kindra Thomson, [facility]   March 4, 2025

## 2025-03-11 NOTE — TELEPHONE ENCOUNTER
3/11/25-      to discuss [facility] IOP [place] referral options. Provided my contact info. Confirmed he is on waitlist for [facility].     EVERETT Carrera 3/11/2025 3:05 PM

## 2025-03-18 ENCOUNTER — OFFICE VISIT (OUTPATIENT)
Dept: BEHAVIORAL HEALTH | Facility: CLINIC | Age: 25
End: 2025-03-18
Payer: COMMERCIAL

## 2025-03-18 VITALS — SYSTOLIC BLOOD PRESSURE: 106 MMHG | DIASTOLIC BLOOD PRESSURE: 47 MMHG | HEART RATE: 70 BPM | OXYGEN SATURATION: 98 %

## 2025-03-18 DIAGNOSIS — F41.8 DEPRESSION WITH ANXIETY: ICD-10-CM

## 2025-03-18 DIAGNOSIS — F11.20 OPIOID USE DISORDER, SEVERE, DEPENDENCE (H): Primary | ICD-10-CM

## 2025-03-18 DIAGNOSIS — Z79.891 ENCOUNTER FOR MONITORING OPIOID MAINTENANCE THERAPY: ICD-10-CM

## 2025-03-18 DIAGNOSIS — Z51.81 ENCOUNTER FOR MONITORING OPIOID MAINTENANCE THERAPY: ICD-10-CM

## 2025-03-18 DIAGNOSIS — F13.10 BENZODIAZEPINE ABUSE (H): ICD-10-CM

## 2025-03-18 PROCEDURE — 80305 DRUG TEST PRSMV DIR OPT OBS: CPT | Performed by: NURSE PRACTITIONER

## 2025-03-18 RX ORDER — FLUOXETINE HYDROCHLORIDE 60 MG/1
60 TABLET, FILM COATED ORAL; ORAL DAILY
Qty: 30 TABLET | Refills: 0 | Status: SHIPPED | OUTPATIENT
Start: 2025-03-18

## 2025-03-18 RX ORDER — BUPRENORPHINE AND NALOXONE 8; 2 MG/1; MG/1
1 FILM, SOLUBLE BUCCAL; SUBLINGUAL 3 TIMES DAILY
Qty: 45 FILM | Refills: 0 | Status: SHIPPED | OUTPATIENT
Start: 2025-03-18 | End: 2025-04-02

## 2025-03-18 RX ORDER — HYDROXYZINE HYDROCHLORIDE 25 MG/1
25 TABLET, FILM COATED ORAL 2 TIMES DAILY PRN
Qty: 60 TABLET | Refills: 0 | Status: SHIPPED | OUTPATIENT
Start: 2025-03-18

## 2025-03-18 RX ORDER — CLONIDINE HYDROCHLORIDE 0.1 MG/1
0.1 TABLET ORAL 2 TIMES DAILY PRN
Qty: 60 TABLET | Refills: 0 | Status: SHIPPED | OUTPATIENT
Start: 2025-03-18

## 2025-03-18 ASSESSMENT — PATIENT HEALTH QUESTIONNAIRE - PHQ9: SUM OF ALL RESPONSES TO PHQ QUESTIONS 1-9: 16

## 2025-03-18 NOTE — NURSING NOTE
[facility]      Rooming information:    Approximate last use of full opioid agonist: 10/2024  Taking buprenorphine? Yes: suboxone  How much per day? 20mg  Number of buprenorphine films/tablets remaining currently: 0  Side effects related to buprenorphine (constipation, dry mouth, sedation?) No   Narcan currently available: Yes  Other recent substance use:    Xanax 1mg last week   NICOTINE-Yes: vape/cigs  If using nicotine, ready to quit? No    Point of care urine drug screen positive for:  Lab Results   Component Value Date    BUP Screen Positive (A) 03/18/2025    BZO Negative 03/18/2025    BAR Negative 03/18/2025    DENNIS Negative 03/18/2025    MAMP Negative 03/18/2025    AMP Negative 03/18/2025    MDMA Negative 03/18/2025    MTD Negative 03/18/2025    [id number] Negative 03/18/2025    OXY Negative 03/18/2025    PCP Negative 03/18/2025    THC Screen Positive (A) 03/18/2025    TEMP 90 F 03/18/2025    SGPOCT 1.015 03/18/2025       *POC urine drug screen does not screen for Fentanyl      Depression Response    Patient completed the PHQ-9 assessment for depression and scored >9? Yes  Question 9 on the PHQ-9 was positive for suicidality? No  Does patient have current mental health provider? No  C-SSRS screener risk level.       Is this a virtual visit? No    I personally notified the following: visit provider          3/18/2025     2:00 PM   PHQ Assesment Total Score(s)   PHQ-9 Score 16         Housing needs: stable     Insurance: active     Current legal issues: none     Contact information up to date? yes     3rd Party Involvement  no today (please obtain ELI if pt would like to include)    Berenice Diallo MA  March 18, 2025  2:54 PM

## 2025-03-18 NOTE — PROGRESS NOTES
[facility] Follow Up    ASSESSMENT/PLAN                                                      1. Opioid use disorder, severe, dependence (H) (Primary)  Denies use, intermittent cravings. Change to 8 mg TID from 12 mg BID. Not interested in Sublocade at this time. CD eval completed with recommendation for outpatient programming. Discussed importance of recovery supports. Minimizing social and environmental triggers.   Confirms access to narcan   - Drugs of Abuse Screen Urine (POC CUPS) POCT  - buprenorphine HCl-naloxone HCl (SUBOXONE) 8-2 MG per film; Place 1 Film under the tongue 3 times daily for 15 days.  Dispense: 45 Film; Refill: 0    2. Encounter for monitoring opioid maintenance therapy  POC UDS completed today.   - Drugs of Abuse Screen Urine (POC CUPS) POCT  - buprenorphine HCl-naloxone HCl (SUBOXONE) 8-2 MG per film; Place 1 Film under the tongue 3 times daily for 15 days.  Dispense: 45 Film; Refill: 0    3. Benzodiazepine abuse (H)  - single use of alprazolam 1 week ago. Discussed social and environmental triggers. Reviewed risk for respiratory depression and death with concurrent use of BZO and opioid. Encouraged abstinence. Monitor.     4. Depression with anxiety  Needs improvement. Increase fluoxetine dose to 60 mg daily.   Continue Hydroxyzine and Clonidine prn   Referrals for psychiatry and therapy   - FLUoxetine HCl 60 MG TABS; Take 60 mg by mouth daily.  Dispense: 30 tablet; Refill: 0  - cloNIDine (CATAPRES) 0.1 MG tablet; Take 1 tablet (0.1 mg) by mouth 2 times daily as needed (anxiety or sleep).  Dispense: 60 tablet; Refill: 0  - hydrOXYzine HCl (ATARAX) 25 MG tablet; Take 1 tablet (25 mg) by mouth 2 times daily as needed for anxiety (sleep).  Dispense: 60 tablet; Refill: 0  - Adult Mental Health  Referral; Future  - Adult Mental Health  Referral; Future     Return in about 2 weeks (around 4/1/2025) for Follow up, with any available provider, in  person.      Patient counseling completed today:  Discussed mechanism of action, potential risks/benefits/side effects of medications and other recommendations above.    Harm reduction counseling including never use alone, availability of naloxone, risks associated with concurrent use of opioids and benzodiazepines, alcohol, or other sedatives, safer administration as applicable.  Discussed importance of avoiding isolation, building a network of supportive relationships, avoiding people/places/things associated with past use to reduce risk of relapse; including motivational interviewing regarding psychosocial interventions.       Recommendations last visit: 3/3/25  1. Opioid use disorder, severe, dependence (H) (Primary)  Overall symptoms well controlled following dose increase of buprenorphine to 24 mg/day. Reports single episode of oxycodone use after losing remaining rx of buprenorphine on 3/1/25. Has since resumed buprenorphine. No withdrawal symptoms.   Continue Suboxone 12-3 mg BID.   Confirms access to [employer] tomorrow, encouraged pt to follow recommendation of    - Drugs of Abuse Screen Urine (POC CUPS) POCT  - Fentanyl Qualitative with Reflex to Quant Urine; Future  - Buprenorphine HCl-Naloxone HCl (SUBOXONE) 12-3 MG FILM per film; Place 1 Film under the tongue 2 times daily.  Dispense: 30 Film; Refill: 0     2. Encounter for monitoring opioid maintenance therapy  Labs as below   - Drugs of Abuse Screen Urine (POC CUPS) POCT  - Fentanyl Qualitative with Reflex to Quant Urine; Future  - Buprenorphine HCl-Naloxone HCl (SUBOXONE) 12-3 MG FILM per film; Place 1 Film under the tongue 2 times daily.  Dispense: 30 Film; Refill: 0     3. Depression with anxiety  Mood stable. Continue Fluoxetine 40 mg daily.   Change Clonidine to 0.1 mg BID PRN   Change Hydroxyzine to 25 mg BID PRN   - cloNIDine (CATAPRES) 0.1 MG tablet; Take 1 tablet (0.1 mg) by mouth 2 times daily as needed (anxiety or sleep).   "Dispense: 60 tablet; Refill: 0  - hydrOXYzine HCl (ATARAX) 25 MG tablet; Take 1 tablet (25 mg) by mouth 2 times daily as needed for anxiety (sleep).  Dispense: 60 tablet; Refill: 0    SUBJECTIVE                                                          CC/HPI:  Rambo Scott is a 24 year old male with PMH seizure 7/25/24 after starting bupropion, depression,  anxiety, tobacco use disorder, stimulant use disorder,  and opioid use disorder on buprenorphine who presents to the [facility] for return visit.      03/18/25 HPI:  - BZO use in context of friend group, spending time with people who are currently using fentanyl and other opioids, he will use xanax to resolve feelings of missing out\" Single use over the past 2 weeks.   - reports \"anxiety and dread\" Either very anxious or depressed. Unsure if Fluoxetine has been effective.   - he is not established with psychiatry or therapy, interested in this   - completed [facility] eval and on wait list for outpatient programming in [place]   - previously taking Bupropion 150 mg daily, effective, also helped with ADD symptoms, however when in was increased to 300 mg dose, had a seizure.   - rinsing mouth after dissolving films, concern for tooth decay    - does not wish to transition to Sublocade   - does not wish to increase dose   - thought of never using again is very overwhelming. Trying to find fulfillment in sobriety. Finds \"sober people are boring\" Unsure what he has in common with people or potential friends who are abstinence from substance use.      Brief History:  Rambo Scott was first seen in [facility] Clinic on 08/01/24. They were referred by [facility] to continue buprenorphine as he began IOP with [facility].  Continued buprenorphine 12mg/day through  at initial visit.   - Brief return to use after initial visit in setting of death of friend's sister  - 8/29/24 buprenorphine increased to 16mg/day  - 1 1/2 week return to use of opioids and other substances " "10/2024 while on trip to [facility]  - 10/25/24 buprenorphine increased to 20mg/day.  Started programming with [facility]  - 1/2025 pt had left [facility], interested in OP with [facility]  - 2/13/25 dose increased to 24 mg/day for cravings     Substance Use History :  Opioids:   Age at first use: [age] started using fentanyl. Was tired of being sick so he was began using Oxycodone 200 mg daily.   Current use: substance: Oxycodone; quantity 200 mg route: insufflation ; timing of last use: 3/1/25                IV drug use: No   History of overdose: No  Previous residential or outpatient treatments for addiction : Yes:  [facility]  Previous medication treatments for addiction: Yes: Suboxone  Longest period of sobriety: 8/14/24 to present  Medical complications related to substance use: none known  Hepatitis C: Negative on 7/17/23, reports more recent screening in July 2024   HIV: Non-reactive on 7/17/23, reports more recent screening in July 2024      Other Addiction History:  Stimulants   H/o daily cocaine use for 5 months prior to treatment; last use 10/2024  Sedatives/hypnotics/anxiolytics:   Occasional use  of Benzos, last use 10/2024  Alcohol:   Denies  Nicotine:   Vaping and smoking  Cannabis:   H/o of daily use, \"was a problem until going to treatment.\"  Hallucinogens/Dissociatives:   Has tried a few times  Eating disorder:  Denies  Gambling:              Once     PAST PSYCHIATRIC HISTORY:  Diagnoses- Depression and anxiety  Suicide Attempts: No   Hospitalizations: No      Social History  Housing status: with his mother  Education: HS graduate  Employment status: Unemployed, not seeking work  Relationship status: Partnered  Children: no children  Legal: denies        3/3/2025     1:00 PM 3/4/2025     1:00 PM 3/18/2025     2:00 PM   PHQ   PHQ-9 Total Score 13 16 16   Q9: Thoughts of better off dead/self-harm past 2 weeks Not at all Not at all Not at all       Labs discussed with patient?  Yes      [place] Prescription Drug " Monitoring Program Reviewed:  Yes    03/03/2025 03/03/2025 2 Buprenorphine-Nalox 12-3mg Flm 30.00 15 Cl Max       Medications:  Current Outpatient Medications   Medication Sig Dispense Refill    acetylcysteine (N-ACETYL CYSTEINE) 600 MG CAPS capsule Take 2 capsules (1,200 mg) by mouth 2 times daily. 120 capsule 11    buprenorphine HCl-naloxone HCl (SUBOXONE) 8-2 MG per film Place 1 Film under the tongue 3 times daily for 15 days. 45 Film 0    cloNIDine (CATAPRES) 0.1 MG tablet Take 1 tablet (0.1 mg) by mouth 2 times daily as needed (anxiety or sleep). 60 tablet 0    docusate sodium (COLACE) 100 MG capsule Take 1 capsule (100 mg) by mouth 2 times daily. 60 capsule 1    FLUoxetine HCl 60 MG TABS Take 60 mg by mouth daily. 30 tablet 0    hydrOXYzine HCl (ATARAX) 25 MG tablet Take 1 tablet (25 mg) by mouth 2 times daily as needed for anxiety (sleep). 60 tablet 0    naloxone (NARCAN) 4 MG/0.1ML nasal spray Spray 1 spray (4 mg) into one nostril alternating nostrils as needed for opioid reversal. every 2-3 minutes until assistance arrives 0.2 mL 11    polyethylene glycol (MIRALAX) 17 GM/Dose powder Take 17 g (1 Capful) by mouth daily. 578 g 0     No current facility-administered medications for this visit.       No Known Allergies    PMH, PSH, FamHx reviewed      OBJECTIVE                                                      /47   Pulse 70   SpO2 98%     Physical Exam  Constitutional:       General: He is not in acute distress.     Appearance: Normal appearance.   Eyes:      Extraocular Movements: Extraocular movements intact.   Pulmonary:      Effort: Pulmonary effort is normal.   Neurological:      Mental Status: He is alert and oriented to person, place, and time.   Psychiatric:         Mood and Affect: Mood normal.         Behavior: Behavior normal.         Thought Content: Thought content does not include suicidal ideation. Thought content does not include suicidal plan.      Comments: Insight and judgment  fair          Labs:    UDS:      Lab Results   Component Value Date    BUP Screen Positive (A) 03/18/2025    BZO Negative 03/18/2025    BAR Negative 03/18/2025    DENNIS Negative 03/18/2025    MAMP Negative 03/18/2025    AMP Negative 03/18/2025    MDMA Negative 03/18/2025    MTD Negative 03/18/2025    [id number] Negative 03/18/2025    OXY Negative 03/18/2025    PCP Negative 03/18/2025    THC Screen Positive (A) 03/18/2025    TEMP 90 F 03/18/2025    SGPOCT 1.015 03/18/2025     *POC urine drug screen does not screen for Fentanyl      Recent Results (from the past 240 hours)   Drugs of Abuse Screen Urine (POC CUPS) POCT    Collection Time: 03/18/25  2:56 PM   Result Value Ref Range    POCT Kit Lot Number [id number]     POCT Kit Expiration Date 08/05/2026     Temperature Urine POCT 90 F 90 F, 92 F, 94 F, 96 F, 98 F, 100 F    Specific [place] POCT 1.015 1.005, 1.015, 1.025    pH Qual Urine POCT 7 pH 4 pH, 5 pH, 7 pH, 9 pH    Creatinine Qual Urine POCT 20 mg/dL 20 mg/dL, 50 mg/dL, 100 mg/dL, 200 mg/dL    Internal QC Qual Urine POCT Valid Valid    Amphetamine Qual Urine POCT Negative Negative    Barbiturate Qual Urine POCT Negative Negative    Buprenorphine Qual Urine POCT Screen Positive (A) Negative    Benzodiazepine Qual Urine POCT Negative Negative    Cocaine Qual Urine POCT Negative Negative    Methamphetamine Qual Urine POCT Negative Negative    MDMA Qual Urine POCT Negative Negative    Methadone Qual Urine POCT Negative Negative    Opiate Qual Urine POCT Negative Negative    Oxycodone Qual Urine POCT Negative Negative    Phencyclidine Qual Urine POCT Negative Negative    THC Qual Urine POCT Screen Positive (A) Negative           At least 32 min spent on day of encounter in review of medical record,  review, obtaining histories, discussing recommendations, counseling/coordination of care     Continued Complex Management  The longitudinal plan of care for Opioid Use Disorder (OUD) was addressed during this visit. Due  to the added complexity in care, I will continue to support Thor in the subsequent management and with ongoing continuity of care.    OBINNA Browne [facility]  [address] [place] [id number] [phone]   No abnormalities

## 2025-03-18 NOTE — PATIENT INSTRUCTIONS
Patient Education   Addiction Medicine  What to Expect  Here's what to expect from our Addiction Medicine program.  About Addiction Medicine  Addiction Medicine clinics help you with substance use problems. You set your own goals. We try to help you reach your goals. Your care plan can include:  Medicine  Creating a recovery plan  Helping you find local resources  Helping with treatment options  Clinic phone number and addresses  Clinic Phone: [phone]  Mental Health and Addiction Clinic  [facility]  [address], [address]  [place] [address]  [facility]  [address], [address]  [place] [address]  Walk-in services  We offer walk-in care for patients at the Recovery Clinic. This is only for patients with Opioid Use Disorder (OUD). Anyone with OUD is welcome. Our providers will refer you to the Recovery Clinic if you're struggling to keep up with your medicines or appointments.  Recovery Clinic ([facility])  [address], [address]  [place] [address]  Phone: [phone]  The Recovery Clinic is open for walk-ins Monday to Friday 9 a.m. to 11:30 a.m. and 12:30 p.m. to 3 p.m.  How it works  Come to your visits every time. The treatment works better when you do.   You can have as many visits as you need. When you're better, we'll refer you back to being cared for by your family doctor.   If you need it, we'll send you to doctors, psychiatrists, therapists, and other providers. We focus on treating addiction. We don't treat other problems, like managing other medicines or non-addiction issues.  About visits  Urine drug testing  We'll often test your pee (urine) for drugs. This is the only way we can know for sure whether or not you're using drugs. It helps us treat you without judgement.   Suboxone (buprenorphine)  If you're taking buprenorphine, you'll have a lot of visits at first. If your problem is getting worse, or you're  "using substances, we may schedule you for extra visits.   Cancelling visits  If you can't come to your visit, please call us right away at [phone]. If you don't cancel at least 24 hours (1 full day) before your visit, that's \"late cancellation.\"  Being late to visits  If you come late, you may not be seen. This will count as a \"no-show.\"  Please call the clinic if you're running late. This will help us plan, but it doesn't mean you'll be seen.   Being late is:  More than 15 minutes late for a return visit.  More than 30 minutes late for your first visit.  If you cancel late or don't show up 2 times within 6 months, we may transfer you to another clinic.   Getting help between visits  If you need help between visits, you can call us Monday to Friday from 8 a.m. to 4:30 p.m. at [phone]. You can also send us a message on [employer].  Medicine refills  If you miss or cancel a visit, you can still ask for a refill. But we can only refill your medicines if you've made a new appointment.  Please call your pharmacy for medicine refills. If you have a question about your refill, call us at [phone].  It takes up to 2 business days to refill your drugs. Let us know 2 to 3 days before you run out. Don't call more than 1 week before you run out. That's too early.   Please make sure we have your right phone number.  If we have a problem with your refill, we'll call you. If we call you, please call us back right away. If you don't, you may not get your medicines quickly.   Call your pharmacy to find out if your medicines are ready.   Keep your medicines in a safe place. Keep them away from pets and children. If your medicines are lost or stolen, we usually don't replace them. We recommend you file a police report if your medicines are stolen. Your insurance may not pay for early refills, even if you have a prescription.  Forms  Please give us at least 3 business days to fill out any forms. Bring the forms to your " visits if you can. We may refer you to other members of your care team to complete the forms.   Emergency care   Call 911 or go to the nearest emergency room if your life or someone else's life is in danger.  Call 988 anytime for the Suicide and Crisis Lifeline.  If you need care when we're closed, call your family doctor to see if they can help. You can also go to urgent care or an emergency room. [facility] emergency rooms may be able to give you buprenorphine or other medicine refills.  Thank you for choosing us for your care.  For informational purposes only. Not to replace the advice of your health care provider. Copyright   2023 [facility]. All rights reserved. [employer] [id number] - REV 05/23.

## 2025-04-11 ENCOUNTER — OFFICE VISIT (OUTPATIENT)
Dept: BEHAVIORAL HEALTH | Facility: CLINIC | Age: 25
End: 2025-04-11
Payer: COMMERCIAL

## 2025-04-11 VITALS — DIASTOLIC BLOOD PRESSURE: 64 MMHG | HEART RATE: 67 BPM | SYSTOLIC BLOOD PRESSURE: 102 MMHG

## 2025-04-11 DIAGNOSIS — Z51.81 ENCOUNTER FOR MONITORING OPIOID MAINTENANCE THERAPY: ICD-10-CM

## 2025-04-11 DIAGNOSIS — F17.200 NICOTINE USE DISORDER: ICD-10-CM

## 2025-04-11 DIAGNOSIS — F12.90 CANNABIS USE DISORDER: ICD-10-CM

## 2025-04-11 DIAGNOSIS — F11.20 OPIOID USE DISORDER, SEVERE, DEPENDENCE (H): Primary | ICD-10-CM

## 2025-04-11 DIAGNOSIS — Z79.891 ENCOUNTER FOR MONITORING OPIOID MAINTENANCE THERAPY: ICD-10-CM

## 2025-04-11 DIAGNOSIS — F41.8 DEPRESSION WITH ANXIETY: ICD-10-CM

## 2025-04-11 PROCEDURE — 80305 DRUG TEST PRSMV DIR OPT OBS: CPT | Performed by: FAMILY MEDICINE

## 2025-04-11 RX ORDER — BUPRENORPHINE AND NALOXONE 8; 2 MG/1; MG/1
1 FILM, SOLUBLE BUCCAL; SUBLINGUAL 3 TIMES DAILY
Qty: 90 FILM | Refills: 0 | Status: SHIPPED | OUTPATIENT
Start: 2025-04-11

## 2025-04-11 RX ORDER — FLUOXETINE HYDROCHLORIDE 60 MG/1
60 TABLET, FILM COATED ORAL; ORAL DAILY
Qty: 30 TABLET | Refills: 0 | Status: SHIPPED | OUTPATIENT
Start: 2025-04-11

## 2025-04-11 RX ORDER — HYDROXYZINE HYDROCHLORIDE 25 MG/1
25 TABLET, FILM COATED ORAL 2 TIMES DAILY PRN
Qty: 60 TABLET | Refills: 0 | Status: SHIPPED | OUTPATIENT
Start: 2025-04-11

## 2025-04-11 ASSESSMENT — PATIENT HEALTH QUESTIONNAIRE - PHQ9: SUM OF ALL RESPONSES TO PHQ QUESTIONS 1-9: 12

## 2025-04-11 ASSESSMENT — PAIN SCALES - GENERAL: PAINLEVEL_OUTOF10: NO PAIN (0)

## 2025-04-11 NOTE — PROGRESS NOTES
[facility] - Recovery Clinic Follow Up    ASSESSMENT/PLAN                                                    1. Opioid use disorder, severe, dependence (H) (Primary)  Reporting control of symptoms, last use of fentanyl remains 3/1/25.   Continue buprenorphine 24mg/day  Pt declines psychosocial treatment  Encouraged individual therapy  Encouraged self care activities  - Drugs of Abuse Screen Urine (POC CUPS) POCT  - buprenorphine HCl-naloxone HCl (SUBOXONE) 8-2 MG per film; Place 1 Film under the tongue 3 times daily.  Dispense: 90 Film; Refill: 0    2. Encounter for monitoring opioid maintenance therapy  - Drugs of Abuse Screen Urine (POC CUPS) POCT    3. Depression with anxiety  Continue fluoxetine and hydroxyzine .  Encouraged individual therapy.  He declined need for resources.   - FLUoxetine HCl 60 MG TABS; Take 60 mg by mouth daily.  Dispense: 30 tablet; Refill: 0  - hydrOXYzine HCl (ATARAX) 25 MG tablet; Take 1 tablet (25 mg) by mouth 2 times daily as needed for anxiety (sleep).  Dispense: 60 tablet; Refill: 0    4. Nicotine use disorder  Not interested in quitting at this time    5. Cannabis use disorder  Recommend he start -1200mg bid.  Reviewed retail sources for this supplement.     Return in about 4 weeks (around 5/9/2025).      Patient counseling completed today:  Discussed mechanism of action, potential risks/benefits/side effects of medications and other recommendations above.    Harm reduction counseling including never use alone, availability of naloxone, risks associated with concurrent use of opioids and benzodiazepines, alcohol, or other sedatives, safer administration as applicable.  Discussed importance of avoiding isolation, building a network of supportive relationships, avoiding people/places/things associated with past use to reduce risk of relapse; including motivational interviewing regarding psychosocial interventions.       SUBJECTIVE                                                           CC/HPI:  Rambo Scott is a 24 year old male with PMH seizure 7/25/24 after starting bupropion, depression,  anxiety, tobacco use disorder, stimulant use disorder,  and opioid use disorder on buprenorphine who presents to the Recovery Clinic for return visit.      Brief History:  Rambo Scott was first seen in Recovery Clinic on 08/01/24. They were referred by [facility] to continue buprenorphine as he began IOP with [facility].  Continued buprenorphine 12mg/day through  at initial visit.   - Brief return to use after initial visit in setting of death of friend's sister  - 8/29/24 buprenorphine increased to 16mg/day  - 1 1/2 week return to use of opioids and other substances 10/2024 while on trip to [facility]  - 10/25/24 buprenorphine increased to 20mg/day.  Started programming with [facility]  - [facility]  - 2/13/25 dose increased to 24 mg/day for cravings       4/11/25 HPI:  Pt states he has continued buprenorphine daily, generally taking 16mg/day, sometimes taking 24mg/day.  He denies opioid cravings or return to use.    He reports using cannabis a few times per week, with goal of decreasing use.    Endorses cravings for stimulants.   Has decided against psychosocial treatment.   States school is difficult, but going well.         Substance Use History :  Opioids:   Age at first use: [age] started using fentanyl. Was tired of being sick so he was began using Oxycodone 200 mg daily.   Current use: substance: Oxycodone; quantity 200 mg route: insufflation ; timing of last use: 3/1/25                IV drug use: No   History of overdose: No  Previous residential or outpatient treatments for addiction : Yes:  [facility]  Previous medication treatments for addiction: Yes: Suboxone  Longest period of sobriety: 8/14/24 to present  Medical complications related to substance use: none known  Hepatitis C: Negative on 7/17/23, reports more recent screening in  "July 2024   HIV: Non-reactive on 7/17/23, reports more recent screening in July 2024      Other Addiction History:  Stimulants   H/o daily cocaine use for 5 months prior to treatment; last use 10/2024  Sedatives/hypnotics/anxiolytics:   Occasional use  of Benzos, last use 10/2024  Alcohol:   Denies  Nicotine:   Vaping and smoking  Cannabis:   H/o of daily use, \"was a problem until going to treatment.\"  Hallucinogens/Dissociatives:   Has tried a few times  Eating disorder:  Denies  Gambling:              Once     PAST PSYCHIATRIC HISTORY:  Diagnoses- Depression and anxiety  Suicide Attempts: No   Hospitalizations: No      Social History  Housing status: with his mother  Education: HS graduate  Employment status: Unemployed, not seeking work  Relationship status: Partnered  Children: no children  Legal: denies        3/4/2025     1:00 PM 3/18/2025     2:00 PM 4/11/2025    12:00 PM   PHQ   PHQ-9 Total Score 16 16 12   Q9: Thoughts of better off dead/self-harm past 2 weeks Not at all Not at all Not at all       Labs discussed with patient?  Yes      [place] Prescription Drug Monitoring Program Reviewed:  Yes    03/03/2025 03/03/2025 2 Buprenorphine-Nalox 12-3mg Flm 30.00 15 Cl Max       Medications:  Current Outpatient Medications   Medication Sig Dispense Refill    acetylcysteine (N-ACETYL CYSTEINE) 600 MG CAPS capsule Take 2 capsules (1,200 mg) by mouth 2 times daily. 120 capsule 11    cloNIDine (CATAPRES) 0.1 MG tablet Take 1 tablet (0.1 mg) by mouth 2 times daily as needed (anxiety or sleep). 60 tablet 0    docusate sodium (COLACE) 100 MG capsule Take 1 capsule (100 mg) by mouth 2 times daily. 60 capsule 1    FLUoxetine HCl 60 MG TABS Take 60 mg by mouth daily. 30 tablet 0    hydrOXYzine HCl (ATARAX) 25 MG tablet Take 1 tablet (25 mg) by mouth 2 times daily as needed for anxiety (sleep). 60 tablet 0    polyethylene glycol (MIRALAX) 17 GM/Dose powder Take 17 g (1 Capful) by mouth daily. 578 g 0    naloxone (NARCAN) " 4 MG/0.1ML nasal spray Spray 1 spray (4 mg) into one nostril alternating nostrils as needed for opioid reversal. every 2-3 minutes until assistance arrives 0.2 mL 11     No current facility-administered medications for this visit.       No Known Allergies    PMH, PSH, FamHx reviewed      OBJECTIVE                                                      /64   Pulse 67     Physical Exam  Constitutional:       General: He is not in acute distress.     Appearance: Normal appearance.   Eyes:      Extraocular Movements: Extraocular movements intact.   Pulmonary:      Effort: Pulmonary effort is normal.   Neurological:      Mental Status: He is alert and oriented to person, place, and time.   Psychiatric:         Mood and Affect: Mood normal.         Behavior: Behavior normal.         Thought Content: Thought content does not include suicidal ideation. Thought content does not include suicidal plan.      Comments: Insight and judgment fair          Labs:  *POC urine drug screen does not screen for Fentanyl      Recent Results (from the past 240 hours)   Drugs of Abuse Screen Urine (POC CUPS) POCT    Collection Time: 04/11/25 12:41 PM   Result Value Ref Range    POCT Kit Lot Number [id number]     POCT Kit Expiration Date 2026-09-19     Temperature Urine POCT 94 F 90 F, 92 F, 94 F, 96 F, 98 F, 100 F    Specific [place] POCT 1.015 1.005, 1.015, 1.025    pH Qual Urine POCT 7 pH 4 pH, 5 pH, 7 pH, 9 pH    Creatinine Qual Urine POCT 50 mg/dL 20 mg/dL, 50 mg/dL, 100 mg/dL, 200 mg/dL    Internal QC Qual Urine POCT Valid Valid    Amphetamine Qual Urine POCT Negative Negative    Barbiturate Qual Urine POCT Negative Negative    Buprenorphine Qual Urine POCT Screen Positive (A) Negative    Benzodiazepine Qual Urine POCT Negative Negative    Cocaine Qual Urine POCT Negative Negative    Methamphetamine Qual Urine POCT Negative Negative    MDMA Qual Urine POCT Negative Negative    Methadone Qual Urine POCT Negative Negative    Opiate  Qual Urine POCT Negative Negative    Oxycodone Qual Urine POCT Negative Negative    Phencyclidine Qual Urine POCT Negative Negative    THC Qual Urine POCT Screen Positive (A) Negative            Continued Complex Management  The longitudinal plan of care for the diagnosis(es)/condition(s) as documented were addressed during this visit. Due to the added complexity in care, I will continue to support Thor in the subsequent management and with ongoing continuity of care.      Deanna Aragon MD  Addiction Medicine  [place] [id number] [address] [phone]

## 2025-04-11 NOTE — PROGRESS NOTES
[facility] Recovery Clinic      Rooming information:    Approximate last use of full opioid agonist: 10/2024  Taking buprenorphine? Yes:   How much per day? 24 mg  Number of buprenorphine films/tablets remaining currently: 0  Side effects related to buprenorphine (constipation, dry mouth, sedation?) No   Narcan currently available: Yes  Other recent substance use:    Cannabis   NICOTINE-Yes: vape and cigarettes   If using nicotine, ready to quit? No    Point of care urine drug screen positive for:  Lab Results   Component Value Date    BUP Screen Positive (A) 04/11/2025    BZO Negative 04/11/2025    BAR Negative 04/11/2025    DENNIS Negative 04/11/2025    MAMP Negative 04/11/2025    AMP Negative 04/11/2025    MDMA Negative 04/11/2025    MTD Negative 04/11/2025    NAD427 Negative 04/11/2025    OXY Negative 04/11/2025    PCP Negative 04/11/2025    THC Screen Positive (A) 04/11/2025    TEMP 94 F 04/11/2025    SGPOCT 1.015 04/11/2025       *POC urine drug screen does not screen for Fentanyl      Depression Response    Patient completed the PHQ-9 assessment for depression and scored >9? Yes  Question 9 on the PHQ-9 was positive for suicidality? No  Does patient have current mental health provider? No  C-SSRS screener risk level.       Is this a virtual visit? No    I personally notified the following: n/a          4/11/2025    12:00 PM   PHQ Assesment Total Score(s)   PHQ-9 Score 12     Housing needs: stable    Insurance: active    Current legal issues: none    Contact information up to date? yes    3rd Party Involvement not today (please obtain ELI if pt would like to include)    Paul Rouse MA  April 11, 2025  12:41 PM

## 2025-04-14 NOTE — PROGRESS NOTES
"  [facility] Individual Session Summary     Session Start Time: 12:51 pm     Session End Time: 1:05 pm     Duration: 14 minutes      DATA: Peer  met with Rambo Tyler in the Recovery Clinic to introduce and to provide patient further support and resources for their recovery.  Engaged in a discussion regarding where pt is at in terms of their recovery. Pt stated he wants a change in meds.  Pt stated \"I'm hangin' on by a thread,\"  PRC & Pt talked about a recovery plan to include meetings.    Intervention: Facilitated an individual session, utilized active listening, provided validation, utilized motivational interviewing techniques, provided shared experience.    Assessment: Patient appeared anxious.    Plan: Peer  will continue to provide support as needed. Provided patient with business card to pt encouraging pt to reach out to peer  if needed additional support and resources.        Patient Identified Goals  To continue to utilize their suboxone as prescribed., To attend and participate in a sober support group meeting., and To continue to take my other medications as prescribed.    Support Needs:   Ongoing care, support and resources for opioid substance use disorder as well as other substances.       Key Risk Factors to Recovery:   PRC encourages being aware of risk factors that can lead to re-use which include avoiding isolation, avoiding triggers and managing cravings in a healthy manner. being open and willing to acceptance and change on a daily basis.  PRC encourages pt to establish a sober network calling tree to reach out to when needed.  Continue to practice honesty with ourselves and trusted support person(s).   PRC encourages regular attendance at recovery based meetings as well as finding a sponsor for mentoring and accountability.   PRC encourages consideration of other services such as counseling for mental health issues which can " correlate with our substance use.      Kenan Gomez  April 14, 2025  2:39 PM    Attestation: Sofia Gautam, [facility], [facility] Provides oversight and supervision of care.

## 2025-04-17 ENCOUNTER — TELEPHONE (OUTPATIENT)
Dept: BEHAVIORAL HEALTH | Facility: CLINIC | Age: 25
End: 2025-04-17
Payer: COMMERCIAL

## 2025-05-27 DIAGNOSIS — F11.20 OPIOID USE DISORDER, SEVERE, DEPENDENCE (H): ICD-10-CM

## 2025-05-27 RX ORDER — BUPRENORPHINE AND NALOXONE 8; 2 MG/1; MG/1
1 FILM, SOLUBLE BUCCAL; SUBLINGUAL 3 TIMES DAILY
Qty: 6 FILM | Refills: 0 | Status: SHIPPED | OUTPATIENT
Start: 2025-05-27 | End: 2025-06-02

## 2025-06-02 DIAGNOSIS — F11.20 OPIOID USE DISORDER, SEVERE, DEPENDENCE (H): ICD-10-CM

## 2025-06-02 RX ORDER — BUPRENORPHINE AND NALOXONE 8; 2 MG/1; MG/1
1 FILM, SOLUBLE BUCCAL; SUBLINGUAL 3 TIMES DAILY
Qty: 12 FILM | Refills: 0 | Status: SHIPPED | OUTPATIENT
Start: 2025-06-02 | End: 2025-06-03

## 2025-06-03 ENCOUNTER — OFFICE VISIT (OUTPATIENT)
Dept: BEHAVIORAL HEALTH | Facility: CLINIC | Age: 25
End: 2025-06-03
Payer: COMMERCIAL

## 2025-06-03 VITALS — OXYGEN SATURATION: 96 % | HEART RATE: 83 BPM | DIASTOLIC BLOOD PRESSURE: 57 MMHG | SYSTOLIC BLOOD PRESSURE: 90 MMHG

## 2025-06-03 DIAGNOSIS — F12.90 CANNABIS USE DISORDER: ICD-10-CM

## 2025-06-03 DIAGNOSIS — F13.10 BENZODIAZEPINE ABUSE (H): ICD-10-CM

## 2025-06-03 DIAGNOSIS — F15.90 STIMULANT USE DISORDER: ICD-10-CM

## 2025-06-03 DIAGNOSIS — F41.8 DEPRESSION WITH ANXIETY: ICD-10-CM

## 2025-06-03 DIAGNOSIS — F11.20 OPIOID USE DISORDER, SEVERE, DEPENDENCE (H): Primary | ICD-10-CM

## 2025-06-03 DIAGNOSIS — F17.200 NICOTINE USE DISORDER: ICD-10-CM

## 2025-06-03 LAB
AMPHETAMINE QUAL URINE POCT: NEGATIVE
BARBITURATE QUAL URINE POCT: NEGATIVE
BENZODIAZEPINE QUAL URINE POCT: NEGATIVE
BUPRENORPHINE QUAL URINE POCT: ABNORMAL
COCAINE QUAL URINE POCT: NEGATIVE
CREAT UR-MCNC: 163 MG/DL
CREATININE QUAL URINE POCT: ABNORMAL
INTERNAL QC QUAL URINE POCT: ABNORMAL
MDMA QUAL URINE POCT: NEGATIVE
METHADONE QUAL URINE POCT: NEGATIVE
METHAMPHETAMINE QUAL URINE POCT: NEGATIVE
OPIATE QUAL URINE POCT: NEGATIVE
OXYCODONE QUAL URINE POCT: NEGATIVE
PH QUAL URINE POCT: ABNORMAL
PHENCYCLIDINE QUAL URINE POCT: NEGATIVE
POCT KIT EXPIRATION DATE: ABNORMAL
POCT KIT LOT NUMBER: ABNORMAL
SPECIFIC GRAVITY POCT: 1.02
TEMPERATURE URINE POCT: ABNORMAL
THC QUAL URINE POCT: ABNORMAL

## 2025-06-03 RX ORDER — FLUOXETINE HYDROCHLORIDE 60 MG/1
60 TABLET, FILM COATED ORAL; ORAL DAILY
Qty: 90 TABLET | Refills: 0 | Status: SHIPPED | OUTPATIENT
Start: 2025-06-03

## 2025-06-03 RX ORDER — CLONIDINE HYDROCHLORIDE 0.1 MG/1
0.1 TABLET ORAL 2 TIMES DAILY PRN
Qty: 60 TABLET | Refills: 0 | Status: SHIPPED | OUTPATIENT
Start: 2025-06-03

## 2025-06-03 RX ORDER — HYDROXYZINE HYDROCHLORIDE 25 MG/1
25 TABLET, FILM COATED ORAL 2 TIMES DAILY PRN
Qty: 60 TABLET | Refills: 0 | Status: SHIPPED | OUTPATIENT
Start: 2025-06-03

## 2025-06-03 RX ORDER — BUPRENORPHINE AND NALOXONE 8; 2 MG/1; MG/1
1 FILM, SOLUBLE BUCCAL; SUBLINGUAL 3 TIMES DAILY
Qty: 45 FILM | Refills: 0 | Status: SHIPPED | OUTPATIENT
Start: 2025-06-03 | End: 2025-06-18

## 2025-06-03 ASSESSMENT — PATIENT HEALTH QUESTIONNAIRE - PHQ9: SUM OF ALL RESPONSES TO PHQ QUESTIONS 1-9: 16

## 2025-06-03 NOTE — PROGRESS NOTES
Assessment & Plan  1. Opioid use disorder, severe, dependence (H) (Primary)  - Currently on Suboxone 8 mg three times a day.   - Continue Suboxone 8 mg three times a day. Consider a two-week follow-up due to recent cocaine use.  - -agreeable to psychotherapy   - Medications ordered: Suboxone, clonidine, hydroxyzine, fluoxetine.  - Drugs of Abuse Screen Urine (POC CUPS) POCT; Standing  - Drug Confirmation Panel Urine with Creat - lab collect; Future  - Drugs of Abuse Screen Urine (POC CUPS) POCT  - Drug Confirmation Panel Urine with Creat - lab collect  - Adult Mental Health  Referral; Future  - buprenorphine HCl-naloxone HCl (SUBOXONE) 8-2 MG per film; Place 1 Film under the tongue 3 times daily for 15 days.  Dispense: 45 Film; Refill: 0    2. Benzodiazepine abuse (H)  -controlled, denies use, consistent with UDS results   - Drugs of Abuse Screen Urine (POC CUPS) POCT; Standing  - Drug Confirmation Panel Urine with Creat - lab collect; Future  - Drugs of Abuse Screen Urine (POC CUPS) POCT  - Drug Confirmation Panel Urine with Creat - lab collect  - Adult Mental Health  Referral; Future    3. Stimulant use disorder  -needs improvement   -agreeable to psychotherapy   - Drugs of Abuse Screen Urine (POC CUPS) POCT; Standing  - Drug Confirmation Panel Urine with Creat - lab collect; Future  - Drugs of Abuse Screen Urine (POC CUPS) POCT  - Drug Confirmation Panel Urine with Creat - lab collect  - Adult Mental Health  Referral; Future    4. Depression with anxiety  -needs improvement    -agreeable to psychotherapy   - Adult Mental Health  Referral; Future  - hydrOXYzine HCl (ATARAX) 25 MG tablet; Take 1 tablet (25 mg) by mouth 2 times daily as needed for anxiety (sleep).  Dispense: 60 tablet; Refill: 0  - FLUoxetine HCl 60 MG TABS; Take 60 mg by mouth daily.  Dispense: 90 tablet; Refill: 0  - cloNIDine (CATAPRES) 0.1 MG tablet; Take 1 tablet (0.1 mg) by mouth 2 times daily as needed  (anxiety or sleep).  Dispense: 60 tablet; Refill: 0  -follow up with psychiatry appointment as scheduled     5. Cannabis use disorder  -needs improvement  -increased cannabis use since grieving the death of his 12 year old bird. Experiencing more paranoia associated with cannabis use.  Recognizes need for abstinence     6. Nicotine use disorder  -no change, not interested in NRT        Return in about 2 weeks (around 6/17/2025).        Patient counseling completed today:  Discussed mechanism of action, potential risks/benefits/side effects of medications and other recommendations above.     Discussed risk of precipitated withdrawal with initiation of buprenorphine in the presence of full opioid agonists.    Reviewed directions for initiation of buprenorphine to reduce risk of precipitated withdrawal and maximize efficacy.    Harm reduction counseling including never use alone, availability of naloxone, risks associated with concurrent use of opioids and benzodiazepines, alcohol, or other sedatives, safer administration as applicable.  Discussed importance of avoiding isolation, building a network of supportive relationships, avoiding people/places/things associated with past use to reduce risk of relapse; including motivational interviewing regarding psychosocial interventions.   SUBJECTIVE                                                        CC/HPI:  Rambo Scott is a 24 year old male with PMH seizure 7/25/24 after starting bupropion, depression,  anxiety, tobacco use disorder, stimulant use disorder,  and opioid use disorder on buprenorphine who [facility] for [facility] visit.       Brief History:  Rambo Scott was first seen in [facility] on 08/01/24. They were referred by [facility] to continue buprenorphine as he began IOP with [facility].  Continued buprenorphine 12mg/day through  at initial visit.   - Brief return to use after initial visit in setting of death of friend's  "sister  - 8/29/24 buprenorphine increased to 16mg/day  - 1 1/2 week return to use of opioids and other substances 10/2024 while on trip to [facility]  - 10/25/24 buprenorphine increased to 20mg/day.  Started programming with [employer]  - 1/2025 pt had left [facility], interested in OP with [facility]  - 2/13/25 dose increased to 24 mg/day for cravings      06/03/25 HPI:  History of Present Illness-  - Rambo Scott, 24-year-old male.  - Experienced an off month starting with the loss of a pet.  - Increased cannabis use to nightly after the pet's death, previously used about once a week.  - Brief episode of cocaine use with friends, which was unusual for him.  - Experienced paranoia and delusional thoughts after using cocaine and cannabis, including fear that his mother was speaking negatively about him.  - Reports feelings of grief and self-hatred following the loss of his pet, which he had for 12 years.  - History of low self-esteem, image issues, and shame, exacerbated by grief.  - Previously attended rehab and therapy but feels he hasn't developed adequate coping skills.  - Reports difficulty with daily tasks, feeling like \"fighting in a dream\" or \"swimming through molasses.\"  - Previously used bupropion, which helped with focus and energy.  Will discuss with psychiatry       Recommendations last visit: 4/11/25  1. Opioid use disorder, severe, dependence (H) (Primary)  Reporting control of symptoms, last use of fentanyl remains 3/1/25.   Continue buprenorphine 24mg/day  Pt declines psychosocial treatment  Encouraged individual therapy  Encouraged self care activities  - Drugs of Abuse Screen Urine (POC CUPS) POCT  - buprenorphine HCl-naloxone HCl (SUBOXONE) 8-2 MG per film; Place 1 Film under the tongue 3 times daily.  Dispense: 90 Film; Refill: 0     2. Encounter for monitoring opioid maintenance therapy  - Drugs of Abuse Screen Urine (POC CUPS) POCT     3. Depression with anxiety  Continue fluoxetine and hydroxyzine " ".  Encouraged individual therapy.  He declined need for resources.   - FLUoxetine HCl 60 MG TABS; Take 60 mg by mouth daily.  Dispense: 30 tablet; Refill: 0  - hydrOXYzine HCl (ATARAX) 25 MG tablet; Take 1 tablet (25 mg) by mouth 2 times daily as needed for anxiety (sleep).  Dispense: 60 tablet; Refill: 0     4. Nicotine use disorder  Not interested in quitting at this time     5. Cannabis use disorder  Recommend he start -1200mg bid.  Reviewed retail sources for this supplement.      Return in about 4 weeks (around 5/9/2025).       Substance Use History :  Opioids:   Age at first use: [age] started using fentanyl. Was tired of being sick so he was began using Oxycodone 200 mg daily.   Current use: substance: Oxycodone; quantity 200 mg route: insufflation ; timing of last use: 3/1/25                IV drug use: No   History of overdose: No  Previous residential or outpatient treatments for addiction : Yes:  [facility]  Previous medication treatments for addiction: Yes: Suboxone  Longest period of sobriety: 8/14/24 to present  Medical complications related to substance use: none known  Hepatitis C: Negative on 7/17/23, reports more recent screening in July 2024   HIV: Non-reactive on 7/17/23, reports more recent screening in July 2024      Other Addiction History:  Stimulants   H/o daily cocaine use for 5 months prior to treatment; last use 10/2024  Sedatives/hypnotics/anxiolytics:   Occasional use  of Benzos, last use 10/2024  Alcohol:   Denies  Nicotine:   Vaping and smoking  Cannabis:   H/o of daily use, \"was a problem until going to treatment.\"  Hallucinogens/Dissociatives:   Has tried a few times  Eating disorder:  Denies  Gambling:              Once     PAST PSYCHIATRIC HISTORY:  Diagnoses- Depression and anxiety  Suicide Attempts: No   Hospitalizations: No      Social History  Housing status: with his mother  Education: HS graduate  Employment status: Unemployed, not seeking work  Relationship status: " Partnered  Children: no children  Legal: denies      4/11/2025    12:00 PM 6/2/2025     3:00 PM 6/3/2025    10:00 AM   PHQ   PHQ-9 Total Score 12 16 16   Q9: Thoughts of better off dead/self-harm past 2 weeks Not at all Not at all Not at all         Labs discussed with patient?  Yes      [place] Prescription Drug Monitoring Program Reviewed:  Yes      Medications:  Current Outpatient Medications   Medication Sig Dispense Refill    acetylcysteine (N-ACETYL CYSTEINE) 600 MG CAPS capsule Take 2 capsules (1,200 mg) by mouth 2 times daily. 120 capsule 11    buprenorphine HCl-naloxone HCl (SUBOXONE) 8-2 MG per film Place 1 Film under the tongue 3 times daily. 12 Film 0    cloNIDine (CATAPRES) 0.1 MG tablet Take 1 tablet (0.1 mg) by mouth 2 times daily as needed (anxiety or sleep). 60 tablet 0    docusate sodium (COLACE) 100 MG capsule Take 1 capsule (100 mg) by mouth 2 times daily. 60 capsule 1    FLUoxetine HCl 60 MG TABS Take 60 mg by mouth daily. 30 tablet 0    hydrOXYzine HCl (ATARAX) 25 MG tablet Take 1 tablet (25 mg) by mouth 2 times daily as needed for anxiety (sleep). 60 tablet 0    naloxone (NARCAN) 4 MG/0.1ML nasal spray Spray 1 spray (4 mg) into one nostril alternating nostrils as needed for opioid reversal. every 2-3 minutes until assistance arrives 0.2 mL 11    polyethylene glycol (MIRALAX) 17 GM/Dose powder Take 17 g (1 Capful) by mouth daily. 578 g 0     No current facility-administered medications for this visit.       No Known Allergies    PMH, PSH, FamHx reviewed      OBJECTIVE                                                      BP 90/57   Pulse 83   SpO2 96%     Physical Exam  Constitutional:       Appearance: Normal appearance.   HENT:      Right Ear: External ear normal.      Left Ear: External ear normal.   Eyes:      Extraocular Movements: Extraocular movements intact.      Pupils: Pupils are equal, round, and reactive to light.   Musculoskeletal:      Cervical back: Normal range of motion.    Neurological:      Mental Status: He is alert.   Psychiatric:         Attention and Perception: Attention normal.         Mood and Affect: Affect is flat.         Behavior: Behavior normal. Behavior is cooperative.         Cognition and Memory: Cognition normal.         Labs:    UDS:    Lab Results   Component Value Date    BUP Screen Positive (A) 06/03/2025    BZO Negative 06/03/2025    BAR Negative 06/03/2025    DENNIS Negative 06/03/2025    MAMP Negative 06/03/2025    AMP Negative 06/03/2025    MDMA Negative 06/03/2025    MTD Negative 06/03/2025    RYK840 Negative 06/03/2025    OXY Negative 06/03/2025    PCP Negative 06/03/2025    THC Screen Positive (A) 06/03/2025    TEMP 94 F 06/03/2025    SGPOCT 1.025 06/03/2025     *POC urine drug screen does not screen for Fentanyl      Recent Results (from the past 240 hours)   Drugs of Abuse Screen Urine (POC CUPS) POCT    Collection Time: 06/03/25 10:07 AM   Result Value Ref Range    POCT Kit Lot Number [id number]     POCT Kit Expiration Date 08/05/2026     Temperature Urine POCT 94 F 90 F, 92 F, 94 F, 96 F, 98 F, 100 F    Specific [place] POCT 1.025 1.005, 1.015, 1.025    pH Qual Urine POCT 5 pH 4 pH, 5 pH, 7 pH, 9 pH    Creatinine Qual Urine POCT 50 mg/dL 20 mg/dL, 50 mg/dL, 100 mg/dL, 200 mg/dL    Internal QC Qual Urine POCT Valid Valid    Amphetamine Qual Urine POCT Negative Negative    Barbiturate Qual Urine POCT Negative Negative    Buprenorphine Qual Urine POCT Screen Positive (A) Negative    Benzodiazepine Qual Urine POCT Negative Negative    Cocaine Qual Urine POCT Negative Negative    Methamphetamine Qual Urine POCT Negative Negative    MDMA Qual Urine POCT Negative Negative    Methadone Qual Urine POCT Negative Negative    Opiate Qual Urine POCT Negative Negative    Oxycodone Qual Urine POCT Negative Negative    Phencyclidine Qual Urine POCT Negative Negative    THC Qual Urine POCT Screen Positive (A) Negative            Continued Complex Management  The  longitudinal plan of care for Opioid Use Disorder (OUD), Stimulant Use Disorder, and Cocaine Use Disorder was addressed during this visit. Due to the added complexity in care, I will continue to support Thor in the subsequent management and with ongoing continuity of care.    Helen Johnson NP    [place] [id number] [place] [id number]  [phone]

## 2025-06-04 ENCOUNTER — PATIENT OUTREACH (OUTPATIENT)
Dept: CARE COORDINATION | Facility: CLINIC | Age: 25
End: 2025-06-04
Payer: COMMERCIAL

## 2025-06-05 LAB
BUPRENORPHINE UR CFM-MCNC: 163 NG/ML
BUPRENORPHINE/CREAT UR: 100 NG/MG {CREAT}
BZE UR CFM-MCNC: >8000 NG/ML
BZE/CREAT UR: ABNORMAL
NALOXONE UR CFM-MCNC: 403 NG/ML
NALOXONE: 247 NG/MG {CREAT}
NORBUPRENORPHINE UR CFM-MCNC: 784 NG/ML
NORBUPRENORPHINE/CREAT UR: 481 NG/MG {CREAT}

## 2025-06-10 ENCOUNTER — TELEPHONE (OUTPATIENT)
Dept: BEHAVIORAL HEALTH | Facility: CLINIC | Age: 25
End: 2025-06-10
Payer: COMMERCIAL

## 2025-06-10 NOTE — TELEPHONE ENCOUNTER
The ELI on file for Lindsey-mom is for emergency contact and JILL collateral; unable to communicate anything further.    Writer contacted patient who was unsure why his mom was calling and felt maybe she was trying to talk to [facility] provider and called the wrong clinic. Patient was appreciative that writer contacted him. Per patient he stopped taking Fluoxetine about 1 week ago. Patient seen a psychiatrist through [facility] on 06/09/2025 and was started on two new medications, however, was unsure of the names.     Writer reminded patient he is due for a follow-up in the [facility] around 06/17/2025.     Kindra Thrasher RN on 6/10/2025 at 10:29 AM

## 2025-06-10 NOTE — TELEPHONE ENCOUNTER
Patient stopped by the [facility] to sign ROIs. Signed a ELI for [facility] ([facility]) where patient has established with a psychiatrist; and another specific ELI for mother in which medications, appointment information, emergency contact, and dates of urinalysis can be shared, but not UA results.    Lore Love RN on 6/10/2025 at 2:23 PM

## 2025-07-08 ENCOUNTER — OFFICE VISIT (OUTPATIENT)
Dept: BEHAVIORAL HEALTH | Facility: CLINIC | Age: 25
End: 2025-07-08
Payer: COMMERCIAL

## 2025-07-08 VITALS — HEART RATE: 78 BPM | DIASTOLIC BLOOD PRESSURE: 56 MMHG | SYSTOLIC BLOOD PRESSURE: 98 MMHG | OXYGEN SATURATION: 97 %

## 2025-07-08 DIAGNOSIS — F13.20 MODERATE BENZODIAZEPINE USE DISORDER (H): ICD-10-CM

## 2025-07-08 DIAGNOSIS — F90.9 ATTENTION DEFICIT HYPERACTIVITY DISORDER (ADHD), UNSPECIFIED ADHD TYPE: ICD-10-CM

## 2025-07-08 DIAGNOSIS — F15.90 STIMULANT USE DISORDER: ICD-10-CM

## 2025-07-08 DIAGNOSIS — F11.20 OPIOID USE DISORDER, SEVERE, DEPENDENCE (H): Primary | ICD-10-CM

## 2025-07-08 DIAGNOSIS — F41.8 DEPRESSION WITH ANXIETY: ICD-10-CM

## 2025-07-08 LAB
AMPHETAMINE QUAL URINE POCT: NEGATIVE
BARBITURATE QUAL URINE POCT: NEGATIVE
BENZODIAZEPINE QUAL URINE POCT: ABNORMAL
BUPRENORPHINE QUAL URINE POCT: ABNORMAL
COCAINE QUAL URINE POCT: ABNORMAL
CREAT UR-MCNC: 219 MG/DL
CREATININE QUAL URINE POCT: ABNORMAL
INTERNAL QC QUAL URINE POCT: ABNORMAL
MDMA QUAL URINE POCT: NEGATIVE
METHADONE QUAL URINE POCT: NEGATIVE
METHAMPHETAMINE QUAL URINE POCT: NEGATIVE
OPIATE QUAL URINE POCT: NEGATIVE
OXYCODONE QUAL URINE POCT: NEGATIVE
PH QUAL URINE POCT: ABNORMAL
PHENCYCLIDINE QUAL URINE POCT: NEGATIVE
POCT KIT EXPIRATION DATE: ABNORMAL
POCT KIT LOT NUMBER: ABNORMAL
SPECIFIC GRAVITY POCT: 1.02
TEMPERATURE URINE POCT: ABNORMAL
THC QUAL URINE POCT: ABNORMAL

## 2025-07-08 PROCEDURE — 80305 DRUG TEST PRSMV DIR OPT OBS: CPT | Performed by: FAMILY MEDICINE

## 2025-07-08 PROCEDURE — 99214 OFFICE O/P EST MOD 30 MIN: CPT | Mod: GC | Performed by: FAMILY MEDICINE

## 2025-07-08 PROCEDURE — G2211 COMPLEX E/M VISIT ADD ON: HCPCS | Performed by: FAMILY MEDICINE

## 2025-07-08 PROCEDURE — 80307 DRUG TEST PRSMV CHEM ANLYZR: CPT | Mod: 90 | Performed by: FAMILY MEDICINE

## 2025-07-08 PROCEDURE — 3074F SYST BP LT 130 MM HG: CPT | Performed by: FAMILY MEDICINE

## 2025-07-08 PROCEDURE — 3078F DIAST BP <80 MM HG: CPT | Performed by: FAMILY MEDICINE

## 2025-07-08 PROCEDURE — 99000 SPECIMEN HANDLING OFFICE-LAB: CPT | Performed by: FAMILY MEDICINE

## 2025-07-08 RX ORDER — GUANFACINE 2 MG/1
2 TABLET, EXTENDED RELEASE ORAL AT BEDTIME
COMMUNITY

## 2025-07-08 RX ORDER — HYDROXYZINE HYDROCHLORIDE 25 MG/1
25 TABLET, FILM COATED ORAL 3 TIMES DAILY PRN
COMMUNITY
Start: 2025-07-08

## 2025-07-08 RX ORDER — BUPRENORPHINE AND NALOXONE 8; 2 MG/1; MG/1
1 FILM, SOLUBLE BUCCAL; SUBLINGUAL 2 TIMES DAILY
Qty: 60 FILM | Refills: 0 | Status: SHIPPED | OUTPATIENT
Start: 2025-07-08

## 2025-07-08 ASSESSMENT — PATIENT HEALTH QUESTIONNAIRE - PHQ9: SUM OF ALL RESPONSES TO PHQ QUESTIONS 1-9: 13

## 2025-07-08 NOTE — NURSING NOTE
[facility]      Rooming information:  Patient reports he only slept for a couple of hours last night, woke up around 3:30 am and took his PM medications and was unable to return to sleep. Patient appears pale, reports his face is more pale than usual but contributes it to not sleeping.     Approximate last use of full opioid agonist: 10/10/2024  Taking buprenorphine? Yes:   How much per day? Suboxone 8-2 mg 16-4 mg TDD, last dose today 07/08/2025.  Number of buprenorphine films/tablets remaining currently: 0  Side effects related to buprenorphine (constipation, dry mouth, sedation?) No   Narcan currently available: Yes  Other recent substance use:    Alcohol , Cannabis , and Cocaine   NICOTINE-Yes: Cigarettes and Vape  If using nicotine, ready to quit? No    Point of care urine drug screen positive for:  Lab Results   Component Value Date    BUP Screen Positive (A) 07/08/2025    BZO Screen Positive (A) 07/08/2025    BAR Negative 07/08/2025    DENNIS Screen Positive (A) 07/08/2025    MAMP Negative 07/08/2025    AMP Negative 07/08/2025    MDMA Negative 07/08/2025    MTD Negative 07/08/2025    ROE123 Negative 07/08/2025    OXY Negative 07/08/2025    PCP Negative 07/08/2025    THC Screen Positive (A) 07/08/2025    TEMP 90 F 07/08/2025    SGPOCT 1.025 07/08/2025       *POC urine drug screen does not screen for Fentanyl    Patient completed the PHQ-9 assessment for depression and scored >9? Yes  Question 9 on the PHQ-9 was positive for suicidality? No  Does patient have current mental health provider? Yes  C-SSRS screener risk level. N/A      Is this a virtual visit? No    I personally notified the following: visit provider          7/8/2025     3:00 PM   PHQ Assesment Total Score(s)   PHQ-9 Score 13         Housing needs: Stable     Insurance: Active    Current legal issues: Denies    Contact information up to date? Yes    3rd Party Involvement None today (please obtain ELI if pt would like to  include)    Kindra Thrasher RN  July 8, 2025  2:50 PM

## 2025-07-08 NOTE — PROGRESS NOTES
[facility] Follow Up    ASSESSMENT/PLAN                                                      1. Opioid use disorder, severe, dependence (H) (Primary)  Stable in early recovery, last opioid use reported was October 2024. Patient prescribed 24 mg TDD suboxone however reports he decreased his dose to 16 mg TDD due to side effects (feeling sluggish, increased anxiety). Encouraged patient to continue at 16 mg TDD. Patient not interested in sublocade currently.   - Drugs of Abuse Screen Urine (POC CUPS) POCT  - Drug Confirmation Panel Urine with Creat - lab collect; Future  - buprenorphine HCl-naloxone HCl (SUBOXONE) 8-2 MG per film; Place 1 Film under the tongue 2 times daily.  Dispense: 60 Film; Refill: 0  - Drug Confirmation Panel Urine with Creat - lab collect  - Ethyl Glucuronide Screen with Reflex to Confirmation, Urine; Standing    2. Moderate benzodiazepine use disorder (H)  3. Alcohol use   Patient endorsed taking xanax on the 4th of July as well as drinking alcohol. Denied recent use prior to that or after that. Patient did seem somewhat tired today and less focused than usual. Will send confirmation testing and continue to monitor closely.  Reviewed risks of concurrent use of opioids, benzodiazepines, alcohol.   - Drugs of Abuse Screen Urine (POC CUPS) POCT  - Ethyl Glucuronide Screen with Reflex to Confirmation, Urine; Standing    3. Stimulant use disorder  Needs improvement, patient endorses recent cocaine use on the fourth of July. Will continue to monitor.  Pt reports his new psychiatrist is considering stimulant therapy for ADHD.    - Drugs of Abuse Screen Urine (POC CUPS) POCT    4. Depression with anxiety  Improving, now established with psychiatry.     5. Attention deficit hyperactivity disorder (ADHD), unspecified ADHD type  Now established with psychiatry. Started guanfacine and Qelbree through psychiatry, reports symptoms of ADHD have improved and is happy with current  regimen. Notes at some point they may consider stimulants but not at this time.        Return in about 4 weeks (around 8/5/2025).      Patient counseling completed today:  Discussed mechanism of action, potential risks/benefits/side effects of medications and other recommendations above.    Harm reduction counseling including never use alone, availability of naloxone, risks associated with concurrent use of opioids and benzodiazepines, alcohol, or other sedatives, safer administration as applicable.  Discussed importance of avoiding isolation, building a network of supportive relationships, avoiding people/places/things associated with past use to reduce risk of relapse; including motivational interviewing regarding psychosocial interventions.   SUBJECTIVE                                                          CC/HPI:  Rambo Scott is a 24 year old male with PMH seizure 7/25/24 after starting bupropion, depression,  anxiety, tobacco use disorder, stimulant use disorder,  and opioid use disorder on buprenorphine who [facility] to the [facility] for return visit.      Brief History:  Rambo Scott was first seen in [facility] on 08/01/24. They were referred by [facility] to continue buprenorphine.  Continued buprenorphine 12mg/day through  at initial visit.   - Brief return to use after initial visit in setting of death of friend's sister  - 8/29/24 buprenorphine increased to 16mg/day  - 1 1/2 week return to use of opioids and other substances 10/2024 while on trip to [facility]  - 10/25/24 buprenorphine increased to 20mg/day.  Started programming with [employer]  - 1/2025 pt had left [employer], interested in OP with [employer]  - 2/13/25 dose increased to 24 mg/day for cravings  - 7/2024 pt had decreased buprenorphine to 16mg/day, reporting control of symptoms    Recommendations last visit: 6/3/25:  1. Opioid use disorder, severe, dependence (H) (Primary)  - Currently on Suboxone 8 mg three times a day.   -  Continue Suboxone 8 mg three times a day. Consider a two-week follow-up due to recent cocaine use.  - -agreeable to psychotherapy   - Medications ordered: Suboxone, clonidine, hydroxyzine, fluoxetine.  - Drugs of Abuse Screen Urine (POC CUPS) POCT; Standing  - Drug Confirmation Panel Urine with Creat - lab collect; Future  - Drugs of Abuse Screen Urine (POC CUPS) POCT  - Drug Confirmation Panel Urine with Creat - lab collect  - Adult Mental Health  Referral; Future  - buprenorphine HCl-naloxone HCl (SUBOXONE) 8-2 MG per film; Place 1 Film under the tongue 3 times daily for 15 days.  Dispense: 45 Film; Refill: 0     2. Benzodiazepine abuse (H)  -controlled, denies use, consistent with UDS results   - Drugs of Abuse Screen Urine (POC CUPS) POCT; Standing  - Drug Confirmation Panel Urine with Creat - lab collect; Future  - Drugs of Abuse Screen Urine (POC CUPS) POCT  - Drug Confirmation Panel Urine with Creat - lab collect  - Adult Mental Health  Referral; Future     3. Stimulant use disorder  -needs improvement   -agreeable to psychotherapy   - Drugs of Abuse Screen Urine (POC CUPS) POCT; Standing  - Drug Confirmation Panel Urine with Creat - lab collect; Future  - Drugs of Abuse Screen Urine (POC CUPS) POCT  - Drug Confirmation Panel Urine with Creat - lab collect  - Adult Mental Health  Referral; Future     4. Depression with anxiety  -needs improvement    -agreeable to psychotherapy   - Adult Mental Health [facility] Referral; Future  - hydrOXYzine HCl (ATARAX) 25 MG tablet; Take 1 tablet (25 mg) by mouth 2 times daily as needed for anxiety (sleep).  Dispense: 60 tablet; Refill: 0  - FLUoxetine HCl 60 MG TABS; Take 60 mg by mouth daily.  Dispense: 90 tablet; Refill: 0  - cloNIDine (CATAPRES) 0.1 MG tablet; Take 1 tablet (0.1 mg) by mouth 2 times daily as needed (anxiety or sleep).  Dispense: 60 tablet; Refill: 0  -follow up with psychiatry appointment as scheduled      5. Cannabis use  disorder  -needs improvement  -increased cannabis use since grieving the death of his 12 year old bird. Experiencing more paranoia associated with cannabis use.  Recognizes need for abstinence      6. Nicotine use disorder  -no change, not interested in NRT       07/08/25 HPI:  He reports things have been going really great since his last visit. He reports he has been moving forward. He also reports he got high on the fourth of July. He reports he hasn't been using every day. He reports he drank 2 drinks and did a lot of cocaine and smoked weed on the fourth. He reports he also might have taken some of his friends benzo on the fourth which is why he probably doesn't remember much from the fourth. He reports otherwise he rarely uses benzodiazepines. His last opioid use was October 2024. He reports he drank for the first time in a long time on the fourth and tried to take it slow.     He does report he was feeling sluggish and having more anxiety at the higher dose of suboxone (24 mg daily) which has improved now that he is at 16 mg daily.     He reports he has been able to establish with psychiatry which has been going well. He was started on guanfacine and Qelbree.     He reports he plans to continue school in the fall. He did not complete classes earlier this spring. He is living with his mom and reports she is great.     He is not interested in sublocade at this time. He is worried that getting an injection will make it so he feels comfortable trying IV drugs. He is also still  to the idea of taking a medication to help him feel better.     Opioid use history:  Age at first use: [age] started using fentanyl. Was tired of being sick so he was began using Oxycodone 200 mg daily.   Current use: substance: Oxycodone; quantity 200 mg route: insufflation ; timing of last use: 3/1/25                IV drug use: No   History of overdose: No  Previous residential or outpatient treatments for addiction : Yes:   "[facility]  Previous medication treatments for addiction: Yes: Suboxone  Longest period of sobriety: 8/14/24 to present  Medical complications related to substance use: none known  Hepatitis C: Negative on 7/17/23, reports more recent screening in July 2024   HIV: Non-reactive on 7/17/23, reports more recent screening in July 2024     Other substance use/addiction history:  Stimulants   H/o daily cocaine use for 5 months prior to treatment; last use 10/2024  Sedatives/hypnotics/anxiolytics:   Occasional use  of Benzos, last use 10/2024  Alcohol:   Denies  Nicotine:   Vaping and smoking  Cannabis:   H/o of daily use, \"was a problem until going to treatment.\"  Hallucinogens/Dissociatives:   Has tried a few times  Eating disorder:  Denies  Gambling:              Once    PAST PSYCHIATRIC HISTORY:  Diagnoses- Depression and anxiety  Suicide Attempts: No   Hospitalizations: No       6/2/2025     3:00 PM 6/3/2025    10:00 AM 7/8/2025     3:00 PM   PHQ   PHQ-9 Total Score 16 16 13   Q9: Thoughts of better off dead/self-harm past 2 weeks Not at all Not at all Not at all       Social History  Housing status: with his mother  Education: HS graduate  Employment status: Unemployed, not seeking work  Relationship status: Partnered  Children: no children  Legal: [email or web address]            [place] Prescription Drug Monitoring Program Reviewed:  Yes  Filled  Written  ID  Drug  QTY  Days  Prescriber  RX #  Dispenser  Refill  Daily Dose*  Pymt Type      06/03/2025 06/03/2025 2 Buprenorphine-Nalox 8-2mg Film 45.00 15 Ta Mathew [id number] Paddy ([id number]) 0/0 24.00 mg [place]   06/02/2025 06/02/2025 2 Buprenorphine-Nalox 8-2mg Film 12.00 4 Ka Par [id number] Paddy ([id number]) 0/0 24.00 mg [place]   05/27/2025 05/27/2025 2 Buprenorphine-Nalox 8-2mg Film 6.00 2 Li Vol [id number] Paddy ([id number]) 0/0 24.00 mg [place]   04/11/2025 04/11/2025 2 Buprenorphine-Nalox 8-2mg Film 90.00 30 Li Vol [id number] Paddy ([id number]) 0/0 24.00 mg [place]       Medications:  Current " Outpatient Medications   Medication Sig Dispense Refill    buprenorphine HCl-naloxone HCl (SUBOXONE) 8-2 MG per film Place 1 Film under the tongue 2 times daily. 60 Film 0    guanFACINE (INTUNIV) 2 MG TB24 24 hr tablet Take 2 mg by mouth at bedtime.      hydrOXYzine HCl (ATARAX) 25 MG tablet Take 1 tablet (25 mg) by mouth 3 times daily as needed for anxiety (sleep).      polyethylene glycol (MIRALAX) 17 GM/Dose powder Take 17 g (1 Capful) by mouth daily. 578 g 0    viloxazine ER (QELBREE) 100 MG CP24 capsule Take 200 mg by mouth daily.      docusate sodium (COLACE) 100 MG capsule Take 1 capsule (100 mg) by mouth 2 times daily. 60 capsule 1    naloxone (NARCAN) 4 MG/0.1ML nasal spray Spray 1 spray (4 mg) into one nostril alternating nostrils as needed for opioid reversal. every 2-3 minutes until assistance arrives 0.2 mL 11     No current facility-administered medications for this visit.       No Known Allergies    PMH, PSH, FamHx reviewed      OBJECTIVE                                                      BP 98/56   Pulse 78   SpO2 97%     Physical Exam  Constitutional:       General: He is not in acute distress.     Comments: Seemed mildly sedated and at times a little confused   Eyes:      General: No scleral icterus.     Extraocular Movements: Extraocular movements intact.      Conjunctiva/sclera: Conjunctivae normal.   Cardiovascular:      Rate and Rhythm: Normal rate.   Pulmonary:      Effort: Pulmonary effort is normal.   Neurological:      General: No focal deficit present.      Mental Status: He is alert and oriented to person, place, and time.   Psychiatric:         Attention and Perception: Attention normal.         Mood and Affect: Mood normal. Affect is not inappropriate.         Speech: Speech normal.         Behavior: Behavior normal. Behavior is cooperative.         Thought Content: Thought content normal.      Comments: Insight and judgement are fair to good         Labs:  *POC urine drug screen  does not screen for Fentanyl      Recent Results (from the past 240 hours)   Drugs of Abuse Screen Urine (POC CUPS) POCT    Collection Time: 07/08/25  3:17 PM   Result Value Ref Range    POCT Kit Lot Number [id number]     POCT Kit Expiration Date 08/05/2026     Temperature Urine POCT 90 F 90 F, 92 F, 94 F, 96 F, 98 F, 100 F    Specific [place] POCT 1.025 1.005, 1.015, 1.025    pH Qual Urine POCT 7 pH 4 pH, 5 pH, 7 pH, 9 pH    Creatinine Qual Urine POCT 50 mg/dL 20 mg/dL, 50 mg/dL, 100 mg/dL, 200 mg/dL    Internal QC Qual Urine POCT Valid Valid    Amphetamine Qual Urine POCT Negative Negative    Barbiturate Qual Urine POCT Negative Negative    Buprenorphine Qual Urine POCT Screen Positive (A) Negative    Benzodiazepine Qual Urine POCT Screen Positive (A) Negative    Cocaine Qual Urine POCT Screen Positive (A) Negative    Methamphetamine Qual Urine POCT Negative Negative    MDMA Qual Urine POCT Negative Negative    Methadone Qual Urine POCT Negative Negative    Opiate Qual Urine POCT Negative Negative    Oxycodone Qual Urine POCT Negative Negative    Phencyclidine Qual Urine POCT Negative Negative    THC Qual Urine POCT Screen Positive (A) Negative        Continued Complex Management  The longitudinal plan of care for Opioid Use Disorder (OUD), Stimulant Use Disorder, and Cocaine Use Disorder was addressed during this visit. Due to the added complexity in care, I will continue to support Thor in the subsequent management and with ongoing continuity of care.    Cristal De La Garza MD on 7/8/2025 at 3:09 PM  Addiction Medicine Fellow    [place] [id number] [address] [address]  [phone]    ICAPRI MD, personally saw this patient with the fellow and agree with the fellow's findings and plan of care as documented in the resident's note.  I personally reviewed the patient's vital signs, medications, labs     Key Findings: reporting control of OUD symptoms, will continue  buprenorphine 16mg/day.  Recent episode of alcohol, cocaine, cannabis, benzodiazepine use.  Denies cravings or regular use.  Reviewed risks.  Encouraged self care activities.

## 2025-07-09 LAB — ETHYL GLUCURONIDE UR QL SCN: NEGATIVE NG/ML

## 2025-07-14 LAB
A-OH ALPRAZ UR QL CFM: PRESENT
BUPRENORPHINE UR CFM-MCNC: 179 NG/ML
BUPRENORPHINE/CREAT UR: 82 NG/MG {CREAT}
BZE UR CFM-MCNC: >8000 NG/ML
BZE/CREAT UR: ABNORMAL
NALOXONE UR CFM-MCNC: 253 NG/ML
NALOXONE: 116 NG/MG {CREAT}
NORBUPRENORPHINE UR CFM-MCNC: 1101 NG/ML
NORBUPRENORPHINE/CREAT UR: 503 NG/MG {CREAT}

## 2025-08-21 ENCOUNTER — HOSPITAL ENCOUNTER (OUTPATIENT)
Dept: BEHAVIORAL HEALTH | Facility: CLINIC | Age: 25
End: 2025-08-21
Attending: PSYCHIATRY & NEUROLOGY
Payer: COMMERCIAL

## 2025-08-21 PROCEDURE — 90791 PSYCH DIAGNOSTIC EVALUATION: CPT

## 2025-08-21 ASSESSMENT — PAIN SCALES - GENERAL: PAINLEVEL_OUTOF10: NO PAIN (0)

## 2025-08-21 ASSESSMENT — ANXIETY QUESTIONNAIRES
7. FEELING AFRAID AS IF SOMETHING AWFUL MIGHT HAPPEN: SEVERAL DAYS
3. WORRYING TOO MUCH ABOUT DIFFERENT THINGS: NEARLY EVERY DAY
GAD7 TOTAL SCORE: 13
GAD7 TOTAL SCORE: 13
1. FEELING NERVOUS, ANXIOUS, OR ON EDGE: NEARLY EVERY DAY
5. BEING SO RESTLESS THAT IT IS HARD TO SIT STILL: NOT AT ALL
7. FEELING AFRAID AS IF SOMETHING AWFUL MIGHT HAPPEN: SEVERAL DAYS
6. BECOMING EASILY ANNOYED OR IRRITABLE: SEVERAL DAYS
GAD7 TOTAL SCORE: 13
4. TROUBLE RELAXING: MORE THAN HALF THE DAYS
IF YOU CHECKED OFF ANY PROBLEMS ON THIS QUESTIONNAIRE, HOW DIFFICULT HAVE THESE PROBLEMS MADE IT FOR YOU TO DO YOUR WORK, TAKE CARE OF THINGS AT HOME, OR GET ALONG WITH OTHER PEOPLE: VERY DIFFICULT
8. IF YOU CHECKED OFF ANY PROBLEMS, HOW DIFFICULT HAVE THESE MADE IT FOR YOU TO DO YOUR WORK, TAKE CARE OF THINGS AT HOME, OR GET ALONG WITH OTHER PEOPLE?: VERY DIFFICULT
2. NOT BEING ABLE TO STOP OR CONTROL WORRYING: NEARLY EVERY DAY

## 2025-08-21 ASSESSMENT — COLUMBIA-SUICIDE SEVERITY RATING SCALE - C-SSRS
1. HAVE YOU WISHED YOU WERE DEAD OR WISHED YOU COULD GO TO SLEEP AND NOT WAKE UP?: NO
2. HAVE YOU ACTUALLY HAD ANY THOUGHTS OF KILLING YOURSELF?: NO

## 2025-08-21 ASSESSMENT — PATIENT HEALTH QUESTIONNAIRE - PHQ9
10. IF YOU CHECKED OFF ANY PROBLEMS, HOW DIFFICULT HAVE THESE PROBLEMS MADE IT FOR YOU TO DO YOUR WORK, TAKE CARE OF THINGS AT HOME, OR GET ALONG WITH OTHER PEOPLE: VERY DIFFICULT
SUM OF ALL RESPONSES TO PHQ QUESTIONS 1-9: 15
SUM OF ALL RESPONSES TO PHQ QUESTIONS 1-9: 15

## 2025-08-26 ENCOUNTER — TELEPHONE (OUTPATIENT)
Dept: BEHAVIORAL HEALTH | Facility: CLINIC | Age: 25
End: 2025-08-26
Payer: COMMERCIAL

## 2025-08-28 ENCOUNTER — OFFICE VISIT (OUTPATIENT)
Dept: BEHAVIORAL HEALTH | Facility: CLINIC | Age: 25
End: 2025-08-28
Payer: COMMERCIAL

## 2025-08-28 VITALS — SYSTOLIC BLOOD PRESSURE: 91 MMHG | OXYGEN SATURATION: 98 % | HEART RATE: 111 BPM | DIASTOLIC BLOOD PRESSURE: 57 MMHG

## 2025-08-28 DIAGNOSIS — F10.90 ALCOHOL USE: ICD-10-CM

## 2025-08-28 DIAGNOSIS — Z79.891 ENCOUNTER FOR MONITORING OPIOID MAINTENANCE THERAPY: ICD-10-CM

## 2025-08-28 DIAGNOSIS — F11.20 OPIOID USE DISORDER, SEVERE, DEPENDENCE (H): Primary | ICD-10-CM

## 2025-08-28 DIAGNOSIS — F14.90 COCAINE USE: ICD-10-CM

## 2025-08-28 DIAGNOSIS — F17.200 TOBACCO USE DISORDER: ICD-10-CM

## 2025-08-28 DIAGNOSIS — Z51.81 ENCOUNTER FOR MONITORING OPIOID MAINTENANCE THERAPY: ICD-10-CM

## 2025-08-28 DIAGNOSIS — F13.10 BENZODIAZEPINE ABUSE (H): ICD-10-CM

## 2025-08-28 LAB
AMPHETAMINE QUAL URINE POCT: NEGATIVE
BARBITURATE QUAL URINE POCT: NEGATIVE
BENZODIAZEPINE QUAL URINE POCT: ABNORMAL
BUPRENORPHINE QUAL URINE POCT: ABNORMAL
COCAINE QUAL URINE POCT: ABNORMAL
CREATININE QUAL URINE POCT: ABNORMAL
INTERNAL QC QUAL URINE POCT: ABNORMAL
MDMA QUAL URINE POCT: NEGATIVE
METHADONE QUAL URINE POCT: NEGATIVE
METHAMPHETAMINE QUAL URINE POCT: NEGATIVE
OPIATE QUAL URINE POCT: NEGATIVE
OXYCODONE QUAL URINE POCT: NEGATIVE
PH QUAL URINE POCT: ABNORMAL
PHENCYCLIDINE QUAL URINE POCT: NEGATIVE
POCT KIT EXPIRATION DATE: ABNORMAL
POCT KIT LOT NUMBER: ABNORMAL
SPECIFIC GRAVITY POCT: 1
TEMPERATURE URINE POCT: ABNORMAL
THC QUAL URINE POCT: ABNORMAL

## 2025-08-28 RX ORDER — BUPRENORPHINE AND NALOXONE 8; 2 MG/1; MG/1
1 FILM, SOLUBLE BUCCAL; SUBLINGUAL 2 TIMES DAILY
Qty: 60 FILM | Refills: 0 | Status: SHIPPED | OUTPATIENT
Start: 2025-08-28

## 2025-08-28 ASSESSMENT — PATIENT HEALTH QUESTIONNAIRE - PHQ9: SUM OF ALL RESPONSES TO PHQ QUESTIONS 1-9: 13

## 2025-09-02 ENCOUNTER — TELEPHONE (OUTPATIENT)
Dept: BEHAVIORAL HEALTH | Facility: CLINIC | Age: 25
End: 2025-09-02
Payer: COMMERCIAL

## 2025-09-03 ENCOUNTER — TELEPHONE (OUTPATIENT)
Dept: BEHAVIORAL HEALTH | Facility: CLINIC | Age: 25
End: 2025-09-03
Payer: COMMERCIAL

## 2025-09-04 ENCOUNTER — TELEPHONE (OUTPATIENT)
Dept: BEHAVIORAL HEALTH | Facility: CLINIC | Age: 25
End: 2025-09-04
Payer: COMMERCIAL